# Patient Record
Sex: MALE | Race: WHITE | NOT HISPANIC OR LATINO
[De-identification: names, ages, dates, MRNs, and addresses within clinical notes are randomized per-mention and may not be internally consistent; named-entity substitution may affect disease eponyms.]

---

## 2017-06-22 ENCOUNTER — APPOINTMENT (OUTPATIENT)
Dept: RADIATION ONCOLOGY | Facility: CLINIC | Age: 72
End: 2017-06-22

## 2017-06-22 VITALS
WEIGHT: 162.81 LBS | HEART RATE: 78 BPM | RESPIRATION RATE: 15 BRPM | OXYGEN SATURATION: 98 % | SYSTOLIC BLOOD PRESSURE: 137 MMHG | BODY MASS INDEX: 25.88 KG/M2 | DIASTOLIC BLOOD PRESSURE: 81 MMHG

## 2017-06-26 LAB — PSA SERPL-MCNC: 0.11 NG/ML

## 2018-06-28 ENCOUNTER — APPOINTMENT (OUTPATIENT)
Dept: RADIATION ONCOLOGY | Facility: CLINIC | Age: 73
End: 2018-06-28
Payer: MEDICARE

## 2018-06-28 VITALS
HEART RATE: 87 BPM | DIASTOLIC BLOOD PRESSURE: 81 MMHG | WEIGHT: 161.6 LBS | BODY MASS INDEX: 25.66 KG/M2 | TEMPERATURE: 97.8 F | RESPIRATION RATE: 15 BRPM | HEIGHT: 66.5 IN | OXYGEN SATURATION: 97 % | SYSTOLIC BLOOD PRESSURE: 131 MMHG

## 2018-06-28 PROCEDURE — 99213 OFFICE O/P EST LOW 20 MIN: CPT

## 2018-07-03 LAB — PSA SERPL-MCNC: 0.09 NG/ML

## 2018-07-30 ENCOUNTER — TRANSCRIPTION ENCOUNTER (OUTPATIENT)
Age: 73
End: 2018-07-30

## 2018-07-30 RX ADMIN — Medication 1000 MILLIGRAM(S): at 22:30

## 2018-07-31 ENCOUNTER — INPATIENT (INPATIENT)
Facility: HOSPITAL | Age: 73
LOS: 1 days | Discharge: HOME | DRG: 864 | End: 2018-08-02
Attending: INTERNAL MEDICINE | Admitting: INTERNAL MEDICINE
Payer: MEDICARE

## 2018-07-31 VITALS
DIASTOLIC BLOOD PRESSURE: 90 MMHG | HEART RATE: 86 BPM | TEMPERATURE: 99 F | OXYGEN SATURATION: 96 % | RESPIRATION RATE: 20 BRPM | SYSTOLIC BLOOD PRESSURE: 151 MMHG

## 2018-07-31 DIAGNOSIS — M54.41 LUMBAGO WITH SCIATICA, RIGHT SIDE: ICD-10-CM

## 2018-07-31 DIAGNOSIS — I51.9 HEART DISEASE, UNSPECIFIED: ICD-10-CM

## 2018-07-31 DIAGNOSIS — E78.5 HYPERLIPIDEMIA, UNSPECIFIED: ICD-10-CM

## 2018-07-31 DIAGNOSIS — F10.10 ALCOHOL ABUSE, UNCOMPLICATED: ICD-10-CM

## 2018-07-31 DIAGNOSIS — R50.9 FEVER, UNSPECIFIED: ICD-10-CM

## 2018-07-31 DIAGNOSIS — C61 MALIGNANT NEOPLASM OF PROSTATE: ICD-10-CM

## 2018-07-31 DIAGNOSIS — I10 ESSENTIAL (PRIMARY) HYPERTENSION: ICD-10-CM

## 2018-07-31 LAB
ALBUMIN SERPL ELPH-MCNC: 4.2 G/DL — SIGNIFICANT CHANGE UP (ref 3.3–5)
ALP SERPL-CCNC: 102 U/L — SIGNIFICANT CHANGE UP (ref 40–120)
ALT FLD-CCNC: 42 U/L — SIGNIFICANT CHANGE UP (ref 10–45)
ANION GAP SERPL CALC-SCNC: 16 MMOL/L — SIGNIFICANT CHANGE UP (ref 5–17)
APTT BLD: 31.9 SEC — SIGNIFICANT CHANGE UP (ref 27.5–37.4)
AST SERPL-CCNC: 35 U/L — SIGNIFICANT CHANGE UP (ref 10–40)
BASE EXCESS BLDV CALC-SCNC: 0.6 MMOL/L — SIGNIFICANT CHANGE UP (ref -2–2)
BASOPHILS # BLD AUTO: 0 K/UL — SIGNIFICANT CHANGE UP (ref 0–0.2)
BASOPHILS NFR BLD AUTO: 0.2 % — SIGNIFICANT CHANGE UP (ref 0–2)
BILIRUB SERPL-MCNC: 0.4 MG/DL — SIGNIFICANT CHANGE UP (ref 0.2–1.2)
BUN SERPL-MCNC: 18 MG/DL — SIGNIFICANT CHANGE UP (ref 7–23)
CA-I SERPL-SCNC: 1.18 MMOL/L — SIGNIFICANT CHANGE UP (ref 1.12–1.3)
CALCIUM SERPL-MCNC: 9.9 MG/DL — SIGNIFICANT CHANGE UP (ref 8.4–10.5)
CHLORIDE BLDV-SCNC: 99 MMOL/L — SIGNIFICANT CHANGE UP (ref 96–108)
CHLORIDE SERPL-SCNC: 95 MMOL/L — LOW (ref 96–108)
CO2 BLDV-SCNC: 26 MMOL/L — SIGNIFICANT CHANGE UP (ref 22–30)
CO2 SERPL-SCNC: 24 MMOL/L — SIGNIFICANT CHANGE UP (ref 22–31)
CREAT SERPL-MCNC: 0.96 MG/DL — SIGNIFICANT CHANGE UP (ref 0.5–1.3)
EOSINOPHIL # BLD AUTO: 0.2 K/UL — SIGNIFICANT CHANGE UP (ref 0–0.5)
EOSINOPHIL NFR BLD AUTO: 3.5 % — SIGNIFICANT CHANGE UP (ref 0–6)
GAS PNL BLDV: 133 MMOL/L — LOW (ref 136–145)
GAS PNL BLDV: SIGNIFICANT CHANGE UP
GAS PNL BLDV: SIGNIFICANT CHANGE UP
GLUCOSE BLDV-MCNC: 106 MG/DL — HIGH (ref 70–99)
GLUCOSE SERPL-MCNC: 110 MG/DL — HIGH (ref 70–99)
HCO3 BLDV-SCNC: 25 MMOL/L — SIGNIFICANT CHANGE UP (ref 21–29)
HCT VFR BLD CALC: 38.6 % — LOW (ref 39–50)
HCT VFR BLDA CALC: 42 % — SIGNIFICANT CHANGE UP (ref 39–50)
HGB BLD CALC-MCNC: 13.5 G/DL — SIGNIFICANT CHANGE UP (ref 13–17)
HGB BLD-MCNC: 12.9 G/DL — LOW (ref 13–17)
HOROWITZ INDEX BLDV+IHG-RTO: SIGNIFICANT CHANGE UP
INR BLD: 0.95 RATIO — SIGNIFICANT CHANGE UP (ref 0.88–1.16)
LACTATE BLDV-MCNC: 1.9 MMOL/L — SIGNIFICANT CHANGE UP (ref 0.7–2)
LIDOCAIN IGE QN: 55 U/L — SIGNIFICANT CHANGE UP (ref 7–60)
LYMPHOCYTES # BLD AUTO: 0.4 K/UL — LOW (ref 1–3.3)
LYMPHOCYTES # BLD AUTO: 6.9 % — LOW (ref 13–44)
MAGNESIUM SERPL-MCNC: 1.9 MG/DL — SIGNIFICANT CHANGE UP (ref 1.6–2.6)
MCHC RBC-ENTMCNC: 30.4 PG — SIGNIFICANT CHANGE UP (ref 27–34)
MCHC RBC-ENTMCNC: 33.4 GM/DL — SIGNIFICANT CHANGE UP (ref 32–36)
MCV RBC AUTO: 91.1 FL — SIGNIFICANT CHANGE UP (ref 80–100)
MONOCYTES # BLD AUTO: 0.4 K/UL — SIGNIFICANT CHANGE UP (ref 0–0.9)
MONOCYTES NFR BLD AUTO: 6.5 % — SIGNIFICANT CHANGE UP (ref 2–14)
NEUTROPHILS # BLD AUTO: 4.8 K/UL — SIGNIFICANT CHANGE UP (ref 1.8–7.4)
NEUTROPHILS NFR BLD AUTO: 82.8 % — HIGH (ref 43–77)
PCO2 BLDV: 42 MMHG — SIGNIFICANT CHANGE UP (ref 35–50)
PH BLDV: 7.39 — SIGNIFICANT CHANGE UP (ref 7.35–7.45)
PLATELET # BLD AUTO: 172 K/UL — SIGNIFICANT CHANGE UP (ref 150–400)
PO2 BLDV: 34 MMHG — SIGNIFICANT CHANGE UP (ref 25–45)
POTASSIUM BLDV-SCNC: 3.3 MMOL/L — LOW (ref 3.5–5.3)
POTASSIUM SERPL-MCNC: 3.9 MMOL/L — SIGNIFICANT CHANGE UP (ref 3.5–5.3)
POTASSIUM SERPL-SCNC: 3.9 MMOL/L — SIGNIFICANT CHANGE UP (ref 3.5–5.3)
PROT SERPL-MCNC: 7.4 G/DL — SIGNIFICANT CHANGE UP (ref 6–8.3)
PROTHROM AB SERPL-ACNC: 10.3 SEC — SIGNIFICANT CHANGE UP (ref 9.8–12.7)
RBC # BLD: 4.23 M/UL — SIGNIFICANT CHANGE UP (ref 4.2–5.8)
RBC # FLD: 11.2 % — SIGNIFICANT CHANGE UP (ref 10.3–14.5)
SAO2 % BLDV: 57 % — LOW (ref 67–88)
SODIUM SERPL-SCNC: 135 MMOL/L — SIGNIFICANT CHANGE UP (ref 135–145)
WBC # BLD: 5.8 K/UL — SIGNIFICANT CHANGE UP (ref 3.8–10.5)
WBC # FLD AUTO: 5.8 K/UL — SIGNIFICANT CHANGE UP (ref 3.8–10.5)

## 2018-07-31 PROCEDURE — 99285 EMERGENCY DEPT VISIT HI MDM: CPT | Mod: 25

## 2018-07-31 PROCEDURE — 71045 X-RAY EXAM CHEST 1 VIEW: CPT | Mod: 26

## 2018-07-31 PROCEDURE — 93010 ELECTROCARDIOGRAM REPORT: CPT

## 2018-07-31 RX ORDER — TIZANIDINE 4 MG/1
14 TABLET ORAL AT BEDTIME
Qty: 0 | Refills: 0 | Status: COMPLETED | OUTPATIENT
Start: 2018-07-31 | End: 2018-07-31

## 2018-07-31 RX ORDER — SODIUM CHLORIDE 9 MG/ML
2000 INJECTION, SOLUTION INTRAVENOUS ONCE
Qty: 0 | Refills: 0 | Status: COMPLETED | OUTPATIENT
Start: 2018-07-31 | End: 2018-07-31

## 2018-07-31 RX ORDER — CEFEPIME 1 G/1
1000 INJECTION, POWDER, FOR SOLUTION INTRAMUSCULAR; INTRAVENOUS ONCE
Qty: 0 | Refills: 0 | Status: COMPLETED | OUTPATIENT
Start: 2018-07-31 | End: 2018-07-31

## 2018-07-31 RX ORDER — VANCOMYCIN HCL 1 G
1000 VIAL (EA) INTRAVENOUS ONCE
Qty: 0 | Refills: 0 | Status: DISCONTINUED | OUTPATIENT
Start: 2018-07-31 | End: 2018-07-31

## 2018-07-31 RX ORDER — MORPHINE SULFATE 50 MG/1
2 CAPSULE, EXTENDED RELEASE ORAL EVERY 4 HOURS
Qty: 0 | Refills: 0 | Status: DISCONTINUED | OUTPATIENT
Start: 2018-07-31 | End: 2018-08-02

## 2018-07-31 RX ORDER — PANTOPRAZOLE SODIUM 20 MG/1
40 TABLET, DELAYED RELEASE ORAL
Qty: 0 | Refills: 0 | Status: DISCONTINUED | OUTPATIENT
Start: 2018-07-31 | End: 2018-08-02

## 2018-07-31 RX ORDER — ASPIRIN/CALCIUM CARB/MAGNESIUM 324 MG
81 TABLET ORAL DAILY
Qty: 0 | Refills: 0 | Status: DISCONTINUED | OUTPATIENT
Start: 2018-07-31 | End: 2018-08-02

## 2018-07-31 RX ORDER — ACETAMINOPHEN 500 MG
1000 TABLET ORAL ONCE
Qty: 0 | Refills: 0 | Status: COMPLETED | OUTPATIENT
Start: 2018-07-31 | End: 2018-07-31

## 2018-07-31 RX ORDER — ENOXAPARIN SODIUM 100 MG/ML
40 INJECTION SUBCUTANEOUS DAILY
Qty: 0 | Refills: 0 | Status: DISCONTINUED | OUTPATIENT
Start: 2018-07-31 | End: 2018-08-02

## 2018-07-31 RX ORDER — SODIUM CHLORIDE 9 MG/ML
1000 INJECTION, SOLUTION INTRAVENOUS
Qty: 0 | Refills: 0 | Status: DISCONTINUED | OUTPATIENT
Start: 2018-07-31 | End: 2018-08-02

## 2018-07-31 RX ORDER — CELECOXIB 200 MG/1
200 CAPSULE ORAL DAILY
Qty: 0 | Refills: 0 | Status: DISCONTINUED | OUTPATIENT
Start: 2018-07-31 | End: 2018-08-02

## 2018-07-31 RX ORDER — ESOMEPRAZOLE MAGNESIUM 40 MG/1
1 CAPSULE, DELAYED RELEASE ORAL
Qty: 0 | Refills: 0 | COMMUNITY

## 2018-07-31 RX ORDER — VANCOMYCIN HCL 1 G
1000 VIAL (EA) INTRAVENOUS ONCE
Qty: 0 | Refills: 0 | Status: COMPLETED | OUTPATIENT
Start: 2018-07-31 | End: 2018-07-31

## 2018-07-31 RX ORDER — LOSARTAN POTASSIUM 100 MG/1
25 TABLET, FILM COATED ORAL DAILY
Qty: 0 | Refills: 0 | Status: DISCONTINUED | OUTPATIENT
Start: 2018-07-31 | End: 2018-08-02

## 2018-07-31 RX ORDER — ATORVASTATIN CALCIUM 80 MG/1
20 TABLET, FILM COATED ORAL AT BEDTIME
Qty: 0 | Refills: 0 | Status: DISCONTINUED | OUTPATIENT
Start: 2018-07-31 | End: 2018-08-02

## 2018-07-31 RX ORDER — PIPERACILLIN AND TAZOBACTAM 4; .5 G/20ML; G/20ML
3.38 INJECTION, POWDER, LYOPHILIZED, FOR SOLUTION INTRAVENOUS ONCE
Qty: 0 | Refills: 0 | Status: DISCONTINUED | OUTPATIENT
Start: 2018-07-31 | End: 2018-07-31

## 2018-07-31 RX ADMIN — SODIUM CHLORIDE 2000 MILLILITER(S): 9 INJECTION, SOLUTION INTRAVENOUS at 16:40

## 2018-07-31 RX ADMIN — SODIUM CHLORIDE 125 MILLILITER(S): 9 INJECTION, SOLUTION INTRAVENOUS at 19:45

## 2018-07-31 RX ADMIN — TIZANIDINE 14 MILLIGRAM(S): 4 TABLET ORAL at 22:35

## 2018-07-31 RX ADMIN — Medication 250 MILLIGRAM(S): at 19:42

## 2018-07-31 RX ADMIN — SODIUM CHLORIDE 2000 MILLILITER(S): 9 INJECTION, SOLUTION INTRAVENOUS at 16:37

## 2018-07-31 RX ADMIN — CEFEPIME 1000 MILLIGRAM(S): 1 INJECTION, POWDER, FOR SOLUTION INTRAMUSCULAR; INTRAVENOUS at 18:55

## 2018-07-31 RX ADMIN — Medication 400 MILLIGRAM(S): at 22:35

## 2018-07-31 RX ADMIN — CEFEPIME 100 MILLIGRAM(S): 1 INJECTION, POWDER, FOR SOLUTION INTRAMUSCULAR; INTRAVENOUS at 18:51

## 2018-07-31 NOTE — H&P ADULT - ASSESSMENT
72M with R lower back pain with radiating pain down R leg with known L3 herniated disc s/p facet joint steroid injection about 2 weeks ago presenting with fever for 5 days along with pain.  No incontinence, sensory loss, LE weakness, headache, respiratory, GI symptoms.  Sent by his ortho-spine doctor, Dr. Rosa, who is requesting admission for IV antibiotics and ortho-spine consult.  Pt had MRI spine done yesterday at [facility] that is reported as negative.

## 2018-07-31 NOTE — H&P ADULT - NEGATIVE GASTROINTESTINAL SYMPTOMS
no diarrhea/no vomiting/no constipation/no nausea/no abdominal pain/no melena/no hematochezia/no change in bowel habits/no flatulence

## 2018-07-31 NOTE — H&P ADULT - RS GEN PE MLT RESP DETAILS PC
respirations non-labored/clear to auscultation bilaterally/airway patent/normal/good air movement/no chest wall tenderness/breath sounds equal

## 2018-07-31 NOTE — H&P ADULT - HISTORY OF PRESENT ILLNESS
: 72M with R lower back pain with radiating pain down R leg with known L3 herniated disc s/p facet joint steroid injection about 2 weeks ago presenting with fever for 5 days along with pain.  No incontinence, sensory loss, LE weakness, headache, respiratory, GI symptoms.  Sent by his ortho-spine doctor, Dr. Rosa, who is requesting admission for IV antibiotics and ortho-spine consult.  Pt had MRI spine done yesterday at [facility] that is reported as negative.    	Gonzalez: 72M w/ R lower back pain rads to R leg and fever at home for 5 days. States he feels weaker. No urinary or bowel incontence, Left lower extremity weakness, chest pain. Dr Rosa is his pain mgmt doctor, requests admission. States he does not yet need pain medication 72M with R lower back pain with radiating pain down R leg with known L3 herniated disc s/p facet joint steroid injection about 2 weeks ago presenting with fever for 5 days along with pain.  No incontinence, sensory loss, LE weakness, headache, respiratory, GI symptoms.  Sent by his ortho-spine doctor, Dr. Rosa, who is requesting admission for IV antibiotics and ortho-spine consult.  Pt had MRI spine done yesterday at [facility] that is reported as negative.

## 2018-07-31 NOTE — ED PROVIDER NOTE - OBJECTIVE STATEMENT
Dr. Leon Note: 72M with R lower back pain with radiating pain down R leg with known L3 herniated disc s/p facet joint steroid injection about 2 weeks ago presenting with fever for 5 days along with pain.  No incontinence, sensory loss, LE weakness, headache, respiratory, GI symptoms.  Sent by his ortho-spine doctor, Dr. Rosa, who is requesting admission for IV antibiotics and ortho-spine consult.  Pt had MRI spine done yesterday at [facility] that is reported as negative. Dr. Leon Note: 72M with R lower back pain with radiating pain down R leg with known L3 herniated disc s/p facet joint steroid injection about 2 weeks ago presenting with fever for 5 days along with pain.  No incontinence, sensory loss, LE weakness, headache, respiratory, GI symptoms.  Sent by his ortho-spine doctor, Dr. Rosa, who is requesting admission for IV antibiotics and ortho-spine consult.  Pt had MRI spine done yesterday at [facility] that is reported as negative.    Gonzalez: 72M w/ R lower back pain rads to R leg and fever at home for 5 days. States he feels weaker. No urinary or bowel incontence, Left lower extremity weakness, chest pain. Dr Rosa is his pain mgmt doctor, requests admission. States he does not yet need pain medication

## 2018-07-31 NOTE — ED PROVIDER NOTE - NS ED ROS FT
General: + fever, chills  HENT: denies nasal congestion, sore throat, rhinorrhea  CV: denies chest pain, palpitations  Resp: denies difficulty breathing, cough  Abdominal: denies nausea, vomiting, diarrhea, abdominal pain  Neuro: + RLE weakness, denies headaches, numbness, tingling, dizziness, lightheadedness.

## 2018-07-31 NOTE — ED PROVIDER NOTE - CARE PLAN
Principal Discharge DX:	FUO (fever of unknown origin)  Secondary Diagnosis:	Low back pain Principal Discharge DX:	Fever of unknown origin  Secondary Diagnosis:	Low back pain

## 2018-07-31 NOTE — ED PROVIDER NOTE - PMH
Acid Reflux    Alcohol Abuse  "admits to daily drinking"  Cardiomegaly  "athlete's heart"  Chronic Lower Back Pain    Diverticulosis    History of Rotator Cuff Tear  bilateral  HTN - Hypertension    Hyperlipidemia    Left Ventricular Diastolic Dysfunction  as per Echo report  Mitral Valve Prolapse    Old Right ACL Tear    Osteoarthrosis    Prostate Cancer  " Trev 6"  Right Thyroid Nodule

## 2018-07-31 NOTE — H&P ADULT - NEGATIVE CARDIOVASCULAR SYMPTOMS
no chest pain/no orthopnea/no palpitations/no dyspnea on exertion/no claudication/no peripheral edema/no paroxysmal nocturnal dyspnea

## 2018-07-31 NOTE — ED PROVIDER NOTE - PHYSICAL EXAMINATION
No midline spine td, 5/5 motor UE and LE, no rashes, No perianal anesthesia. No midline spine td, 5/5 motor UE and LE, no rashes, No perianal anesthesia.    Gonzalez:  CONSTITUTIONAL: elderly male in painful distress  HEAD: Normocephalic; atraumatic  EYES: PERRLA, EOMI  ENMT: External appears normal; normal oropharynx  CARD: Normal Sl, S2; no audible murmurs,rubs, or gallops  RESP: Breathing comfortably on RA, normal wob, lungs ctab/l, no audible wheezes/rales/rhonchi  ABD: Soft, non-distended; non-tender; no rebound or guarding  Back: no erythema, no midline tenderness  EXT: No cyanosis/clubbing/edema, normal ROM in all four extremities; non-tender to palpation; distal pulses intact  SKIN: Warm, dry, no rashes  NEURO: aaox3, strength grossly intact

## 2018-07-31 NOTE — ED PROVIDER NOTE - PROGRESS NOTE DETAILS
Ortho spine states they have low suspicion for epidural abscess. MRI done yesterday as outpatient does not indicate any abscess. CXR and UA negative. Will admit for fever of unknown origin associated with ESR elevation

## 2018-07-31 NOTE — ED ADULT NURSE REASSESSMENT NOTE - NS ED NURSE REASSESS COMMENT FT1
Report received from ROMA Houser. Pt resting, c/o pain to R thigh but denying pain meds. Awaiting bed assignment. VSS. Safety maintained at all times, bed in lowest position, call bell in reach. Will continue to monitor closely.

## 2018-07-31 NOTE — ED ADULT NURSE NOTE - NSIMPLEMENTINTERV_GEN_ALL_ED
Implemented All Universal Safety Interventions:  [place] to call system. Call bell, personal items and telephone within reach. Instruct patient to call for assistance. Room bathroom lighting operational. Non-slip footwear when patient is off stretcher. Physically safe environment: no spills, clutter or unnecessary equipment. Stretcher in lowest position, wheels locked, appropriate side rails in place.

## 2018-07-31 NOTE — ED PROVIDER NOTE - PSH
History of Arthroscopy of bilateral shoulders  with rotator cuff repair 5/2006  History of Arthroscopy of Knee (multiple)  bilateral, last 2006  History of Left  Total Hip Replacement  3/2008  History of Tonsillectomy and Adenoidectomy    History of Total Hip Replacement  Right 10/27/01

## 2018-07-31 NOTE — ED PROVIDER NOTE - MEDICAL DECISION MAKING DETAILS
Dr. Leon Note: concern for infection after facet joint steroid injection despite negative MRI lumbar, screening labs, spine consult, and consider admission.

## 2018-08-01 LAB
ALBUMIN SERPL ELPH-MCNC: 3.5 G/DL — SIGNIFICANT CHANGE UP (ref 3.3–5)
ALP SERPL-CCNC: 78 U/L — SIGNIFICANT CHANGE UP (ref 40–120)
ALT FLD-CCNC: 30 U/L — SIGNIFICANT CHANGE UP (ref 10–45)
ANION GAP SERPL CALC-SCNC: 12 MMOL/L — SIGNIFICANT CHANGE UP (ref 5–17)
APPEARANCE UR: CLEAR — SIGNIFICANT CHANGE UP
AST SERPL-CCNC: 24 U/L — SIGNIFICANT CHANGE UP (ref 10–40)
BILIRUB SERPL-MCNC: 0.3 MG/DL — SIGNIFICANT CHANGE UP (ref 0.2–1.2)
BILIRUB UR-MCNC: NEGATIVE — SIGNIFICANT CHANGE UP
BUN SERPL-MCNC: 13 MG/DL — SIGNIFICANT CHANGE UP (ref 7–23)
CALCIUM SERPL-MCNC: 9 MG/DL — SIGNIFICANT CHANGE UP (ref 8.4–10.5)
CHLORIDE SERPL-SCNC: 99 MMOL/L — SIGNIFICANT CHANGE UP (ref 96–108)
CO2 SERPL-SCNC: 23 MMOL/L — SIGNIFICANT CHANGE UP (ref 22–31)
COLOR SPEC: YELLOW — SIGNIFICANT CHANGE UP
CREAT SERPL-MCNC: 0.83 MG/DL — SIGNIFICANT CHANGE UP (ref 0.5–1.3)
DIFF PNL FLD: NEGATIVE — SIGNIFICANT CHANGE UP
GLUCOSE SERPL-MCNC: 101 MG/DL — HIGH (ref 70–99)
GLUCOSE UR QL: NEGATIVE — SIGNIFICANT CHANGE UP
HCT VFR BLD CALC: 33.6 % — LOW (ref 39–50)
HGB BLD-MCNC: 11.4 G/DL — LOW (ref 13–17)
KETONES UR-MCNC: ABNORMAL
LEUKOCYTE ESTERASE UR-ACNC: NEGATIVE — SIGNIFICANT CHANGE UP
MCHC RBC-ENTMCNC: 30.6 PG — SIGNIFICANT CHANGE UP (ref 27–34)
MCHC RBC-ENTMCNC: 33.9 GM/DL — SIGNIFICANT CHANGE UP (ref 32–36)
MCV RBC AUTO: 90.1 FL — SIGNIFICANT CHANGE UP (ref 80–100)
NITRITE UR-MCNC: NEGATIVE — SIGNIFICANT CHANGE UP
PH UR: 6.5 — SIGNIFICANT CHANGE UP (ref 5–8)
PLATELET # BLD AUTO: 165 K/UL — SIGNIFICANT CHANGE UP (ref 150–400)
POTASSIUM SERPL-MCNC: 4.1 MMOL/L — SIGNIFICANT CHANGE UP (ref 3.5–5.3)
POTASSIUM SERPL-SCNC: 4.1 MMOL/L — SIGNIFICANT CHANGE UP (ref 3.5–5.3)
PROT SERPL-MCNC: 6.2 G/DL — SIGNIFICANT CHANGE UP (ref 6–8.3)
PROT UR-MCNC: SIGNIFICANT CHANGE UP
RBC # BLD: 3.73 M/UL — LOW (ref 4.2–5.8)
RBC # FLD: 12.4 % — SIGNIFICANT CHANGE UP (ref 10.3–14.5)
SODIUM SERPL-SCNC: 134 MMOL/L — LOW (ref 135–145)
SP GR SPEC: 1.02 — SIGNIFICANT CHANGE UP (ref 1.01–1.02)
UROBILINOGEN FLD QL: NEGATIVE — SIGNIFICANT CHANGE UP
WBC # BLD: 6.56 K/UL — SIGNIFICANT CHANGE UP (ref 3.8–10.5)
WBC # FLD AUTO: 6.56 K/UL — SIGNIFICANT CHANGE UP (ref 3.8–10.5)

## 2018-08-01 PROCEDURE — 99222 1ST HOSP IP/OBS MODERATE 55: CPT | Mod: GC

## 2018-08-01 RX ORDER — ACETAMINOPHEN 500 MG
1000 TABLET ORAL ONCE
Qty: 0 | Refills: 0 | Status: COMPLETED | OUTPATIENT
Start: 2018-08-01 | End: 2018-08-02

## 2018-08-01 RX ORDER — TIZANIDINE 4 MG/1
22 TABLET ORAL ONCE
Qty: 0 | Refills: 0 | Status: COMPLETED | OUTPATIENT
Start: 2018-08-01 | End: 2018-08-01

## 2018-08-01 RX ADMIN — CELECOXIB 200 MILLIGRAM(S): 200 CAPSULE ORAL at 12:46

## 2018-08-01 RX ADMIN — ENOXAPARIN SODIUM 40 MILLIGRAM(S): 100 INJECTION SUBCUTANEOUS at 11:53

## 2018-08-01 RX ADMIN — LOSARTAN POTASSIUM 25 MILLIGRAM(S): 100 TABLET, FILM COATED ORAL at 06:09

## 2018-08-01 RX ADMIN — PANTOPRAZOLE SODIUM 40 MILLIGRAM(S): 20 TABLET, DELAYED RELEASE ORAL at 06:09

## 2018-08-01 RX ADMIN — TIZANIDINE 22 MILLIGRAM(S): 4 TABLET ORAL at 21:49

## 2018-08-01 RX ADMIN — ATORVASTATIN CALCIUM 20 MILLIGRAM(S): 80 TABLET, FILM COATED ORAL at 21:48

## 2018-08-01 RX ADMIN — SODIUM CHLORIDE 125 MILLILITER(S): 9 INJECTION, SOLUTION INTRAVENOUS at 06:10

## 2018-08-01 RX ADMIN — CELECOXIB 200 MILLIGRAM(S): 200 CAPSULE ORAL at 11:52

## 2018-08-01 RX ADMIN — Medication 81 MILLIGRAM(S): at 11:52

## 2018-08-01 NOTE — CONSULT NOTE ADULT - ASSESSMENT
72M chronic low back pain, HTN, HLD, GERD, OA, prostate ca in remission x 10 years p/w fevers.     FULL NOTE TO FOLLOW 72M chronic low back pain, HTN, HLD, GERD, OA, prostate ca in remission x 10 years p/w fevers. No clear source of infection. MRI Spine reviewed w radiology attending and shows no signs of abscess or discitis that would explain fevers. It is possible that the Tizanidine which was started two weeks ago could be causing the fevers.     - monitor off abx for now  - follow up BCx- if BCx are negative this may be due to Tizanidine; at that point could consider switching to another muscle relaxant.   - if pt spikes a fever, would re-culture and obtain ct c/a/p w contrast if able to eval for occult infection     Will follow. D/w primary team. 72M chronic low back pain, HTN, HLD, GERD, OA, prostate ca in remission x 10 years p/w fevers. No clear source of infection. MRI Spine reviewed w radiology attending and shows no signs of abscess or discitis that would explain fevers. It is possible that the Tizanidine which was started two weeks ago could be causing the fevers.   Overall ? drug fever vs other infectious etiology     Plan:   - monitor off abx for now  - follow up BCx- if BCx are negative this may be due to Tizanidine; at that point could consider switching to another muscle relaxant.   - if pt spikes a fever, would re-culture and obtain ct c/a/p w contrast to eval for occult infection   - ESR/CRP in am     Will follow. D/w primary team. 72M chronic low back pain, HTN, HLD, GERD, OA, prostate ca in remission x 10 years p/w fevers. No clear source of infection. MRI Spine reviewed w radiology attending and shows no signs of abscess or discitis that would explain fevers. It is possible that the Tizanidine which was started two weeks ago could be causing the fevers.   No fever or leucocytosis in hospital.   Overall ? drug fever vs other infectious etiology     Plan:   - monitor off abx for now  - follow up BCx- if BCx are negative this may be due to Tizanidine; at that point could consider switching to another muscle relaxant.   - if pt spikes a fever, would re-culture and obtain ct c/a/p w contrast to eval for occult infection   - ESR/CRP somewhat elevated.   - If spikes overnight after repeating blood can start Vanco 1 gm iv q12h with Cefepime 2 gm iv q12h     Will follow. D/w primary team.

## 2018-08-01 NOTE — PROGRESS NOTE ADULT - ASSESSMENT
72M with R lower back pain with radiating pain down R leg with known L3 herniated disc s/p facet joint steroid injection about 2 weeks ago presenting with fever for 5 days along with pain.  No incontinence, sensory loss, LE weakness, headache, respiratory, GI symptoms.  Sent by his ortho-spine doctor, Dr. Rosa, who is requesting admission for IV antibiotics and ortho-spine consult.  Pt had MRI spine done yesterday at [facility] that is reported as negative.      Problem/Plan - 1:  ·  Problem: Fever of unknown origin.  Plan: S/P Blood cultures . MRI LS Spine done yesterday is negative .Ortho and ID helping. Will rpt ESR and CRP in AM.      Problem/Plan - 2:  ·  Problem: Chronic bilateral low back pain with right-sided sciatica.  Plan: S/P MRI and supposed to get Steroid shot outpt. Continue home meds. Neurology consult pending      Problem/Plan - 3:  ·  Problem: Hyperlipidemia.  Plan: Statin.      Problem/Plan - 4:  ·  Problem: HTN (hypertension).  Plan: BP meds with parameters.      Problem/Plan - 5:  ·  Problem: Left Ventricular Diastolic Dysfunction.  Plan: Not in Acute CHF .      Problem/Plan - 6:  Problem: Alcohol Abuse. Plan: Denies any h/O of Alcohol withdrawal     Problem/Plan - 7:  ·  Problem: Prostate Cancer.  Plan: Outpt follow up.

## 2018-08-01 NOTE — CONSULT NOTE ADULT - SUBJECTIVE AND OBJECTIVE BOX
72yMale who is followed by Dr. Michel for pain management and was recently given an epidural injected 1-2 weeks ago(pt unsure exact day) presents with high grade fever starting 7/28, with temperatures elevated persistently to 101s-102s. He took aspirin and ice for the fevers. Denies worsening in back pain or radicular symptoms (has right thigh radiculopathy when he ambulates) since he started having fevers. Denies bowel or bladder incontinence.     Denies recent travel. Denies recent weight loss or gain. Denies recent illness.     The patient states that he has no medical history beside multiple orthopaedic surgeries and back pain.     PMH:  Old Right ACL Tear  Alcohol Abuse  Left Ventricular Diastolic Dysfunction  Mitral Valve Prolapse  Acid Reflux  History of Rotator Cuff Tear  Osteoarthrosis  Right Thyroid Nodule  Prostate Cancer  Diverticulosis  Chronic Lower Back Pain  Hyperlipidemia  Cardiomegaly  HTN - Hypertension    PSH:  History of Total Hip Replacement  History of Arthroscopy of Knee (multiple)  History of Left  Total Hip Replacement  History of Arthroscopy of bilateral shoulders  History of Tonsillectomy and Adenoidectomy    AH:    Meds: See med rec    T(C): 36.4 (08-01-18 @ 01:34)  HR: 66 (08-01-18 @ 01:34)  BP: 134/86 (08-01-18 @ 01:34)  RR: 18 (08-01-18 @ 01:34)  SpO2: 97% (08-01-18 @ 01:34)  Wt(kg): --                        12.9   5.8   )-----------( 172      ( 31 Jul 2018 16:28 )             38.6     07-31    135  |  95<L>  |  18  ----------------------------<  110<H>  3.9   |  24  |  0.96    Ca    9.9      31 Jul 2018 16:28  Mg     1.9     07-31    TPro  7.4  /  Alb  4.2  /  TBili  0.4  /  DBili  x   /  AST  35  /  ALT  42  /  AlkPhos  102  07-31    PT/INR - ( 31 Jul 2018 16:28 )   PT: 10.3 sec;   INR: 0.95 ratio         PTT - ( 31 Jul 2018 16:28 )  PTT:31.9 sec      Spine PE:  Skin intact  No gross deformity  No midline TTP C/T/L/S spine  No bony step offs  No paraspinal muscle ttp/hypertonicity   Negative Straight leg raise  Negative clonus/Babinski/moraes  No saddle anesthesia    Motor:                   C5                C6              C7               C8           T1   R            5/5                5/5            5/5             5/5          5/5  L             5/5               5/5             5/5             5/5          5/5                L2             L3             L4               L5            S1  R         5/5           5/5          5/5             5/5           5/5  L          5/5          5/5           5/5             5/5           5/5    Sensory:            C5         C6         C7      C8       T1        (0=absent, 1=impaired, 2=normal, NT=not testable)  R         2            2           2        2         2  L          2            2           2        2         2               L2          L3         L4      L5       S1         (0=absent, 1=impaired, 2=normal, NT=not testable)  R         2            2            2        2        2  L          2            2           2        2         2    [facility]  No signs of OM or epidural abscess  chronic lumbar scoliosis and spondylithesis

## 2018-08-01 NOTE — CONSULT NOTE ADULT - ATTENDING COMMENTS
Question regarding zanaflex as source of fever.  I think this is generally unlikely.  It is listed as a 1-2% side effect on labeling, but I doubt this as a cause of his fever on therapeutic dosing.  Would consider other infectious, neoplastic, inflammatory etiologies. Ok by me to reduce the dose of tizanidine slightly as tolerated for comfort, either way.  discussed with patient who feels well today, no further fever.   He is already followed closely by spine for his lumbar stenosis, getting intermittent spinal injections.
Jose Richards  Pager: [phone]. If no response or past 5 pm call [phone].

## 2018-08-01 NOTE — CONSULT NOTE ADULT - ASSESSMENT
73YO M w/ multiple knee replacements and B/L total hip replacement, chronic R lower back pain, known L3 herniated disc w/ radiating pain down R leg admitted for fevers/rigors/chills at home. Admitted for infectious workup for source, for which thus far w/u has been negative. Takes up to 24mg Zanaflex daily x2 weeks for RLE. Has remained afebrile since admission, concern for possible medication induced fever.   May be 2/2 zanaflex overuse as this can be seen as a side effect. Would caution withdrawing too rapidly as there are adverse effects with this too ie rebound hypertension, tachycardia, hypertonia especially in elderly patients on high doses. Would continue monitoring for fever and consider slow taper to a lower dose daily or perhaps switching to another muscle relaxant. 73YO M w/ multiple knee replacements and B/L total hip replacement, chronic R lower back pain, known L3 herniated disc w/ radiating pain down R leg admitted for fevers/rigors/chills at home. Admitted for infectious workup for source, for which thus far w/u has been negative. Takes up to 24mg Zanaflex daily x2 weeks for RLE. Has remained afebrile since admission, concern per medicine team for possible medication induced fever.     May be 2/2 zanaflex overuse as this can be seen as a side effect. Would caution withdrawing too rapidly as there are adverse effects with this too ie rebound hypertension, tachycardia, hypertonia especially in elderly patients on high doses. Would continue monitoring for fever and consider slow taper to a lower dose daily or perhaps switching to another muscle relaxant.

## 2018-08-01 NOTE — CONSULT NOTE ADULT - ASSESSMENT
72M with fevers of unknown origin, no signs of spinal source    ·	multimodal pain control  ·	WBAT, OOB, PT  ·	No acute ortho intervention  ·	Please page back as needed if you have questions    Ortho [phone]

## 2018-08-01 NOTE — PROGRESS NOTE ADULT - SUBJECTIVE AND OBJECTIVE BOX
INTERVAL HPI/OVERNIGHT EVENTS: I feel better and want to go home tomorrow.  Vital Signs Last 24 Hrs  T(C): 36.8 (01 Aug 2018 14:24), Max: 37.3 (2018 20:59)  T(F): 98.3 (01 Aug 2018 14:24), Max: 99.1 (2018 20:59)  HR: 71 (01 Aug 2018 14:24) (66 - 78)  BP: 126/68 (01 Aug 2018 14:24) (126/68 - 157/83)  BP(mean): --  RR: 18 (01 Aug 2018 14:24) (18 - 18)  SpO2: 96% (01 Aug 2018 14:24) (96% - 99%)  I&O's Summary    2018 07:  -  01 Aug 2018 07:00  --------------------------------------------------------  IN: 1575 mL / OUT: 700 mL / NET: 875 mL    01 Aug 2018 07:01  -  01 Aug 2018 15:47  --------------------------------------------------------  IN: 480 mL / OUT: 420 mL / NET: 60 mL      MEDICATIONS  (STANDING):  aspirin enteric coated 81 milliGRAM(s) Oral daily  atorvastatin 20 milliGRAM(s) Oral at bedtime  celecoxib 200 milliGRAM(s) Oral daily  enoxaparin Injectable 40 milliGRAM(s) SubCutaneous daily  lactated ringers. 1000 milliLiter(s) (125 mL/Hr) IV Continuous <Continuous>  losartan 25 milliGRAM(s) Oral daily  pantoprazole    Tablet 40 milliGRAM(s) Oral before breakfast  tiZANidine 22 milliGRAM(s) Oral once    MEDICATIONS  (PRN):  morphine  - Injectable 2 milliGRAM(s) IV Push every 4 hours PRN Moderate Pain (4 - 6)    LABS:                        11.4   6.56  )-----------( 165      ( 01 Aug 2018 08:35 )             33.6         134<L>  |  99  |  13  ----------------------------<  101<H>  4.1   |  23  |  0.83    Ca    9.0      01 Aug 2018 06:07  Mg     1.9         TPro  6.2  /  Alb  3.5  /  TBili  0.3  /  DBili  x   /  AST  24  /  ALT  30  /  AlkPhos  78      PT/INR - ( 2018 16:28 )   PT: 10.3 sec;   INR: 0.95 ratio         PTT - ( 2018 16:28 )  PTT:31.9 sec  Urinalysis Basic - ( 01 Aug 2018 02:30 )    Color: Yellow / Appearance: Clear / S.016 / pH: x  Gluc: x / Ketone: Trace  / Bili: Negative / Urobili: Negative   Blood: x / Protein: Trace / Nitrite: Negative   Leuk Esterase: Negative / RBC: x / WBC x   Sq Epi: x / Non Sq Epi: x / Bacteria: x      CAPILLARY BLOOD GLUCOSE            Urinalysis Basic - ( 01 Aug 2018 02:30 )    Color: Yellow / Appearance: Clear / S.016 / pH: x  Gluc: x / Ketone: Trace  / Bili: Negative / Urobili: Negative   Blood: x / Protein: Trace / Nitrite: Negative   Leuk Esterase: Negative / RBC: x / WBC x   Sq Epi: x / Non Sq Epi: x / Bacteria: x      REVIEW OF SYSTEMS:  CONSTITUTIONAL: No fever, weight loss, or fatigue  EYES: No eye pain, visual disturbances, or discharge  ENMT:  No difficulty hearing, tinnitus, vertigo; No sinus or throat pain  NECK: No pain or stiffness  BREASTS: No pain, masses, or nipple discharge  RESPIRATORY: No cough, wheezing, chills or hemoptysis; No shortness of breath  CARDIOVASCULAR: No chest pain, palpitations, dizziness, or leg swelling  GASTROINTESTINAL: No abdominal or epigastric pain. No nausea, vomiting, or hematemesis; No diarrhea or constipation. No melena or hematochezia.  GENITOURINARY: No dysuria, frequency, hematuria, or incontinence  NEUROLOGICAL: No headaches, memory loss, loss of strength, numbness, or tremors  SKIN: No itching, burning, rashes, or lesions   LYMPH NODES: No enlarged glands  ENDOCRINE: No heat back, and rt  extremity pain    Consultant(s) Notes Reviewed:  [x ] YES  [ ] NO    PHYSICAL EXAM:  GENERAL: NAD, well-groomed, well-developed,not in any distress ,  HEAD:  Atraumatic, Normocephalic  EYES: EOMI, PERRLA, conjunctiva and sclera clear  ENMT: No tonsillar erythema, exudates, or enlargement; Moist mucous membranes, Good dentition, No lesions  NECK: Supple, No JVD, Normal thyroid  NERVOUS SYSTEM:  Alert & Oriented X3, No focal deficit , Power G5 with  no sensory loss   CHEST/LUNG: Good air entry bilateral with no  rales, rhonchi, wheezing, or rubs  HEART: Regular rate and rhythm; No murmurs, rubs, or gallops  ABDOMEN: Soft, Nontender, Nondistended; Bowel sounds present  EXTREMITIES:  2+ Peripheral Pulses, No clubbing, cyanosis, or edema  SKIN: No rashes or lesions    Care Discussed with Consultants/Other Providers [ x] YES  [ ] NO

## 2018-08-01 NOTE — CONSULT NOTE ADULT - SUBJECTIVE AND OBJECTIVE BOX
HPI:  72M chronic low back pain, HTN, HLD, GERD, OA, prostate ca in remission x 10 years p/w fevers. Pt states he was sent in by ortho for fevers 102-104F. Pt states he started having fevers on Saturday. Pt was sent by his ortho-spine doctor, Dr. Rosa, who is requesting admission for IV antibiotics and ortho-spine consult.  He had recent R facet joint steroid injection two weeks ago. No worsening hip or back pain. Had MRI spine done on Monday, results sent down to our radiology department to be uploaded. Has b/l prosthetic knees, no worsening knee pain or erythema. Denies cough, sore throat, rhinorrhea, cp, abd pain, n/v, diarrhea. Denies change in mental status (family at bedside confirms). Denies recent travel or animal/bug/tick bites. No rash. no pets. [place] in [place]. Recently started on tizanidine for leg muscle spasm; isn't on anti-psychotics.     Pt has not had any fevers in the hospital.     PAST MEDICAL & SURGICAL HISTORY:  Old Right ACL Tear  Alcohol Abuse: &quot;admits to daily drinking&quot;  Left Ventricular Diastolic Dysfunction: as per Echo report  Mitral Valve Prolapse  Acid Reflux  History of Rotator Cuff Tear: bilateral  Osteoarthrosis  Right Thyroid Nodule  Prostate Cancer: &quot; [place] 6&quot;  Diverticulosis  Chronic Lower Back Pain  Hyperlipidemia  Cardiomegaly: &quot;athlete&#x27;s heart&quot;  HTN - Hypertension  History of Total Hip Replacement: Right 10/27/01  History of Arthroscopy of Knee (multiple): bilateral, last   History of Left  Total Hip Replacement: 3/2008  History of Arthroscopy of bilateral shoulders: with rotator cuff repair 2006  History of Tonsillectomy and Adenoidectomy      Allergies    No Known Allergies    Intolerances        ANTIMICROBIALS:      OTHER MEDS:  aspirin enteric coated 81 milliGRAM(s) Oral daily  atorvastatin 20 milliGRAM(s) Oral at bedtime  celecoxib 200 milliGRAM(s) Oral daily  enoxaparin Injectable 40 milliGRAM(s) SubCutaneous daily  lactated ringers. 1000 milliLiter(s) IV Continuous <Continuous>  losartan 25 milliGRAM(s) Oral daily  morphine  - Injectable 2 milliGRAM(s) IV Push every 4 hours PRN  pantoprazole    Tablet 40 milliGRAM(s) Oral before breakfast  tiZANidine 22 milliGRAM(s) Oral once      SOCIAL HISTORY:    Substance Use (street drugs): denies  Tobacco Usage:  denies  Alcohol Usage: drinks etoh    FAMILY HISTORY:  No pertinent family history in first degree relatives      ROS:   All other systems negative     Constitutional: + fever, no chills, no weight loss, no night sweats  Eye: no eye pain, no redness, no vision changes  ENT:  no sore throat, no rhinorrhea  Cardiovascular:  no chest pain, no palpitation  Respiratory:  no SOB, no cough  GI:  no abd pain, no vomiting, no diarrhea  urinary: no dysuria, no hematuria, no flank pain  : no  discharge or bleeding  musculoskeletal:  no joint pain, no joint swelling  skin:  no rash  neurology:  no headache, no seizure, no change in mental status  psych: no anxiety, no depression     Physical Exam:    General:    NAD, non toxic  Head: atraumatic, normocephalic  Eyes: normal sclera and conjunctiva  ENT:   no oropharyngeal lesions, no LAD, neck supple  Cardio:    regular S1,S2, no murmur  Respiratory:   clear b/l, no wheezing  abd:   soft, BS +, not tender, no hepatosplenomegaly  :     no CVAT, no suprapubic tenderness, no jernigan  Musculoskeletal : no joint swelling, no edema  Skin:    no rash  vascular: no lines, normal pulses  Neurologic:     no focal deficits  psych: normal affect, no suicidal ideation      Drug Dosing Weight    Weight (kg): 71.8 (2018 20:59)    Vital Signs Last 24 Hrs  T(F): 98.1 (18 @ 05:48), Max: 99.1 (18 @ 20:59)    Vital Signs Last 24 Hrs  HR: 74 (18 @ 05:48) (66 - 88)  BP: 146/85 (18 @ 05:48) (134/86 - 157/83)  RR: 18 (18 @ 05:48)  SpO2: 99% (18 @ 05:48) (96% - 100%)  Wt(kg): --                          11.4   6.56  )-----------( 165      ( 01 Aug 2018 08:35 )             33.6           134<L>  |  99  |  13  ----------------------------<  101<H>  4.1   |  23  |  0.83    Ca    9.0      01 Aug 2018 06:07  Mg     1.9     07-31    TPro  6.2  /  Alb  3.5  /  TBili  0.3  /  DBili  x   /  AST  24  /  ALT  30  /  AlkPhos  78  08-      Urinalysis Basic - ( 01 Aug 2018 02:30 )    Color: Yellow / Appearance: Clear / S.016 / pH: x  Gluc: x / Ketone: Trace  / Bili: Negative / Urobili: Negative   Blood: x / Protein: Trace / Nitrite: Negative   Leuk Esterase: Negative / RBC: x / WBC x   Sq Epi: x / Non Sq Epi: x / Bacteria: x        MICROBIOLOGY:  BCx x 2 sent and pending          RADIOLOGY:    CXR- no focal consolidation  - MRI spine- outside images being uploaded by radiology HPI:  72M chronic low back pain, HTN, HLD, GERD, OA, prostate ca in remission x 10 years p/w fevers. Pt states he was sent in by ortho for fevers 102-104F. Pt states he started having fevers on Saturday. Pt was sent by his ortho-spine doctor, Dr. Rosa, who is requesting admission for IV antibiotics and ortho-spine consult.  He had recent R facet joint steroid injection two weeks ago. No worsening hip or back pain. Had MRI spine done on Monday, results sent down to our radiology department to be uploaded. Has b/l prosthetic knees, no worsening knee pain or erythema. Denies cough, sore throat, rhinorrhea, cp, abd pain, n/v, diarrhea. Denies change in mental status (family at bedside confirms). Denies recent travel or animal/bug/tick bites. No rash. no pets. [place] the water in [place]. Recently started on tizanidine for leg muscle spasm; isn't on anti-psychotics.     Pt has not had any fevers in the hospital. Currently feels well.       PAST MEDICAL & SURGICAL HISTORY:  Old Right ACL Tear  Alcohol Abuse: &quot;admits to daily drinking&quot;  Left Ventricular Diastolic Dysfunction: as per Echo report  Mitral Valve Prolapse  Acid Reflux  History of Rotator Cuff Tear: bilateral  Osteoarthrosis  Right Thyroid Nodule  Prostate Cancer: &quot; [place] 6&quot;  Diverticulosis  Chronic Lower Back Pain  Hyperlipidemia  Cardiomegaly: &quot;athlete&#x27;s heart&quot;  HTN - Hypertension  History of Total Hip Replacement: Right 10/27/01  History of Arthroscopy of Knee (multiple): bilateral, last   History of Left  Total Hip Replacement: 3/2008  History of Arthroscopy of bilateral shoulders: with rotator cuff repair 2006  History of Tonsillectomy and Adenoidectomy      Allergies  No Known Allergies        ANTIMICROBIALS:      OTHER MEDS:  aspirin enteric coated 81 milliGRAM(s) Oral daily  atorvastatin 20 milliGRAM(s) Oral at bedtime  celecoxib 200 milliGRAM(s) Oral daily  enoxaparin Injectable 40 milliGRAM(s) SubCutaneous daily  lactated ringers. 1000 milliLiter(s) IV Continuous <Continuous>  losartan 25 milliGRAM(s) Oral daily  morphine  - Injectable 2 milliGRAM(s) IV Push every 4 hours PRN  pantoprazole    Tablet 40 milliGRAM(s) Oral before breakfast  tiZANidine 22 milliGRAM(s) Oral once      SOCIAL HISTORY:  Lives with partner,   Substance Use (street drugs): denies  Tobacco Usage:  denies  Alcohol Usage: drinks etoh    FAMILY HISTORY:  No pertinent family history in first degree relatives      ROS:   All other systems negative     Constitutional: + fever, no chills, no weight loss, no night sweats  Eye: no eye pain, no redness, no vision changes  ENT:  no sore throat, no rhinorrhea  Cardiovascular:  no chest pain, no palpitation  Respiratory:  no SOB, no cough  GI:  no abd pain, no vomiting, no diarrhea  urinary: no dysuria, no hematuria, no flank pain  : no  discharge or bleeding  musculoskeletal:  chronic b/l knee joint pain,   skin:  no rash  neurology:  no headache, no change in mental status  psych: no anxiety, no depression       Physical Exam:    General:    NAD, non toxic  Head: atraumatic, normocephalic  Eyes: normal sclera and conjunctiva  ENT:   no oropharyngeal lesions, no LAD, neck supple  Cardio:    regular S1,S2, no murmur  Respiratory:   clear b/l, no wheezing  abd:   soft, BS +, not tender,   :     no CVAT, no suprapubic tenderness, no jernigan  Musculoskeletal : +effusion b/l knee joints with well healed scars.   Skin:    no rash  vascular: no lines, normal pulses  Neurologic: No point tenderness in back, able to move B/l lower extremities.    psych: normal affect, no suicidal ideation      Vital Signs Last 24 Hrs  T(F): 98.1 (18 @ 05:48), Max: 99.1 (18 @ 20:59)    Vital Signs Last 24 Hrs  HR: 74 (18 @ 05:48) (66 - 88)  BP: 146/85 (18 @ 05:48) (134/86 - 157/83)  RR: 18 (18 @ 05:48)  SpO2: 99% (18 @ 05:48) (96% - 100%)                            11.4   6.56  )-----------( 165      ( 01 Aug 2018 08:35 )             33.6           134<L>  |  99  |  13  ----------------------------<  101<H>  4.1   |  23  |  0.83    Ca    9.0      01 Aug 2018 06:07  Mg     1.9     07-    TPro  6.2  /  Alb  3.5  /  TBili  0.3  /  DBili  x   /  AST  24  /  ALT  30  /  AlkPhos  78  08-      Urinalysis Basic - ( 01 Aug 2018 02:30 )    Color: Yellow / Appearance: Clear / S.016 / pH: x  Gluc: x / Ketone: Trace  / Bili: Negative / Urobili: Negative   Blood: x / Protein: Trace / Nitrite: Negative   Leuk Esterase: Negative / RBC: x / WBC x   Sq Epi: x / Non Sq Epi: x / Bacteria: x      MICROBIOLOGY:  BCx x 2 sent and pending      RADIOLOGY: Imaging reviewed personally.   CXR- no focal consolidation  - MRI spine- outside imaging reviewed with Neuroradiology.

## 2018-08-01 NOTE — CONSULT NOTE ADULT - SUBJECTIVE AND OBJECTIVE BOX
NEUROLOGY CONSULT     71YO M with multiple knee replacements and B/L total hip replacement, chronic R lower back pain with radiating pain down R leg with known L3 herniated disc admitted with fever+sweatiness for 2 days at home. Pt states that he noticed he had rigors and chills on Sat, and temp at home was 104 and did not respond to antipyretics. He decided to come into ER on Thursday for this. He has not had T spikes since being in hospital. He attributes this to AC in hospital of which he does not have at home.   He has herniated disc at L3 and had been getting steroid facet joint injections since about Feb by Ortho Spine.   He has been in a lot of discomfort in R leg that is chronic but worsened. He did not want to take pain meds, so ortho spine gave him Zanaflex 2mg to take 2-3 at night and in the morning. He notes that he takes about 6-7 tabs twice a day for a total of 22-24mg a day.   Sent by his ortho-spine doctor, Dr. Rosa, who is requesting admission for IV antibiotics and ortho-spine consult.    Pt had MRI spine done yesterday at [facility] that is reportedly negative.    No incontinence, sensory loss, headache, respiratory, GI symptoms. No recent viral illness, no body aches. Has chronic joint pain otherwise. Denies N/V/D. No HAs.     PAST MEDICAL & SURGICAL HISTORY:  Old Right ACL Tear  Alcohol Abuse: &quot;admits to daily drinking&quot;  Left Ventricular Diastolic Dysfunction: as per Echo report  Mitral Valve Prolapse  Acid Reflux  History of Rotator Cuff Tear: bilateral  Osteoarthrosis  Right Thyroid Nodule  Prostate Cancer: &quot; [place] 6&quot;  Diverticulosis  Chronic Lower Back Pain  Hyperlipidemia  Cardiomegaly: &quot;athlete&#x27;s heart&quot;  HTN - Hypertension  History of Total Hip Replacement: Right 10/27/01  History of Arthroscopy of Knee (multiple): bilateral, last 2006  History of Left  Total Hip Replacement: 3/2008  History of Arthroscopy of bilateral shoulders: with rotator cuff repair 5/2006  History of Tonsillectomy and Adenoidectomy      Allergies    No Known Allergies    Intolerances        MEDICATIONS  (STANDING):  acetaminophen  IVPB. 1000 milliGRAM(s) IV Intermittent once  aspirin enteric coated 81 milliGRAM(s) Oral daily  atorvastatin 20 milliGRAM(s) Oral at bedtime  celecoxib 200 milliGRAM(s) Oral daily  enoxaparin Injectable 40 milliGRAM(s) SubCutaneous daily  lactated ringers. 1000 milliLiter(s) (125 mL/Hr) IV Continuous <Continuous>  losartan 25 milliGRAM(s) Oral daily  pantoprazole    Tablet 40 milliGRAM(s) Oral before breakfast  tiZANidine 22 milliGRAM(s) Oral once    MEDICATIONS  (PRN):  morphine  - Injectable 2 milliGRAM(s) IV Push every 4 hours PRN Moderate Pain (4 - 6)      SOCIAL HISTORY: Denies tobacco/EtOH/drug use     FAMILY HISTORY:  No pertinent family history in first degree relatives      REVIEW OF SYSTEMS:  CONSTITUTIONAL:  No weight loss, fever, chills, weakness or fatigue.  HEENT:  Eyes:  No visual loss, blurred vision, double vision or yellow sclerae. Ears, Nose, Throat:  No hearing loss, sneezing, congestion, runny nose or sore throat.  SKIN:  No rash or itching.  CARDIOVASCULAR:  No chest pain, chest pressure or chest discomfort. No palpitations or edema.  RESPIRATORY:  No shortness of breath, cough or sputum.  GASTROINTESTINAL:  No anorexia, nausea, vomiting or diarrhea. No abdominal pain or blood.  GENITOURINARY:  denies incontinence/retention   NEUROLOGICAL:  see hpi  MUSCULOSKELETAL:  No muscle, back pain, joint pain or stiffness.  HEMATOLOGIC:  No anemia, bleeding or bruising.  LYMPHATICS:  No enlarged nodes. No history of splenectomy.  PSYCHIATRIC:  No history of depression or anxiety.  ENDOCRINOLOGIC:  No reports of sweating, cold or heat intolerance. No polyuria or polydipsia.      Vital Signs Last 24 Hrs  T(C): 36.8 (01 Aug 2018 14:24), Max: 37.3 (31 Jul 2018 20:59)  T(F): 98.3 (01 Aug 2018 14:24), Max: 99.1 (31 Jul 2018 20:59)  HR: 71 (01 Aug 2018 14:24) (66 - 78)  BP: 126/68 (01 Aug 2018 14:24) (126/68 - 157/80)  BP(mean): --  RR: 18 (01 Aug 2018 14:24) (18 - 18)  SpO2: 96% (01 Aug 2018 14:24) (96% - 99%)    PHYSICAL EXAM:   General appearance: No acute distress                 Mental Status: AAOx3, fluent speech, follows simple commands, able to name  Cranial Nerves: EOMI, PERRL, VFF, V1-V3 intact, face symmetric,  tongue midline  Motor: strength 5/5 throughout. No drift x4 - did not allow manual strength testing against resistance in R LE due to pain but no drift   Sensation: grossly intact to LT/PP throughout extremities.   Coordination: FTN intact b/l  Reflexes: 0 bilateral biceps, brachioradialis, patellar and ankle  Gait: deferred     LABS:                          11.4   6.56  )-----------( 165      ( 01 Aug 2018 08:35 )             33.6     08-01    134<L>  |  99  |  13  ----------------------------<  101<H>  4.1   |  23  |  0.83    Ca    9.0      01 Aug 2018 06:07  Mg     1.9     07-31    TPro  6.2  /  Alb  3.5  /  TBili  0.3  /  DBili  x   /  AST  24  /  ALT  30  /  AlkPhos  78  08-01      IMAGING: NEUROLOGY CONSULT     71YO M with multiple knee replacements and B/L total hip replacement, chronic R lower back pain with radiating pain down R leg with known L3 herniated disc admitted with fever+sweatiness for 2 days at home. Pt states that he noticed he had rigors and chills on Sat, and temp at home was 104 and did not respond to antipyretics. He decided to come into ER on Thursday for this. He has not had T spikes since being in hospital. He attributes this to AC in hospital of which he does not have at home.   He has herniated disc at L3 and had been getting steroid facet joint injections since about Feb by Ortho Spine. limited mobility, exercise intolerance  He has been in a lot of discomfort in R leg that is chronic but worsened. He did not want to take pain meds, so ortho spine gave him Zanaflex 2mg to take 2-3 at night and in the morning. He notes that he takes about 6-7 tabs twice a day for a total of 22-24mg a day.   Sent by his ortho-spine doctor, Dr. Rosa, who is requesting admission for IV antibiotics and ortho-spine consult.    Pt had MRI spine done yesterday at [facility] that is reportedly negative for compression.    No incontinence, sensory loss, headache, respiratory, GI symptoms. No recent viral illness, no body aches. Has chronic joint pain otherwise. Denies N/V/D. No HAs.     PAST MEDICAL & SURGICAL HISTORY:  Old Right ACL Tear  Alcohol Abuse: &quot;admits to daily drinking&quot;  Left Ventricular Diastolic Dysfunction: as per Echo report  Mitral Valve Prolapse  Acid Reflux  History of Rotator Cuff Tear: bilateral  Osteoarthrosis  Right Thyroid Nodule  Prostate Cancer: &quot; [place] 6&quot;  Diverticulosis  Chronic Lower Back Pain  Hyperlipidemia  Cardiomegaly: &quot;athlete&#x27;s heart&quot;  HTN - Hypertension  History of Total Hip Replacement: Right 10/27/01  History of Arthroscopy of Knee (multiple): bilateral, last 2006  History of Left  Total Hip Replacement: 3/2008  History of Arthroscopy of bilateral shoulders: with rotator cuff repair 5/2006  History of Tonsillectomy and Adenoidectomy      Allergies    No Known Allergies    Intolerances        MEDICATIONS  (STANDING):  acetaminophen  IVPB. 1000 milliGRAM(s) IV Intermittent once  aspirin enteric coated 81 milliGRAM(s) Oral daily  atorvastatin 20 milliGRAM(s) Oral at bedtime  celecoxib 200 milliGRAM(s) Oral daily  enoxaparin Injectable 40 milliGRAM(s) SubCutaneous daily  lactated ringers. 1000 milliLiter(s) (125 mL/Hr) IV Continuous <Continuous>  losartan 25 milliGRAM(s) Oral daily  pantoprazole    Tablet 40 milliGRAM(s) Oral before breakfast  tiZANidine 22 milliGRAM(s) Oral once    MEDICATIONS  (PRN):  morphine  - Injectable 2 milliGRAM(s) IV Push every 4 hours PRN Moderate Pain (4 - 6)      SOCIAL HISTORY: Denies tobacco/EtOH/drug use     FAMILY HISTORY:  No pertinent family history in first degree relatives      REVIEW OF SYSTEMS:  CONSTITUTIONAL:  No weight loss, fever, chills, weakness or fatigue.  HEENT:  Eyes:  No visual loss, blurred vision, double vision or yellow sclerae. Ears, Nose, Throat:  No hearing loss, sneezing, congestion, runny nose or sore throat.  SKIN:  No rash or itching.  CARDIOVASCULAR:  No chest pain, chest pressure or chest discomfort. No palpitations or edema.  RESPIRATORY:  No shortness of breath, cough or sputum.  GASTROINTESTINAL:  No anorexia, nausea, vomiting or diarrhea. No abdominal pain or blood.  GENITOURINARY:  denies incontinence/retention   NEUROLOGICAL:  see hpi  MUSCULOSKELETAL:  No muscle, back pain, joint pain or stiffness.  HEMATOLOGIC:  No anemia, bleeding or bruising.  LYMPHATICS:  No enlarged nodes. No history of splenectomy.  PSYCHIATRIC:  No history of depression or anxiety.  ENDOCRINOLOGIC:  No reports of sweating, cold or heat intolerance. No polyuria or polydipsia.      Vital Signs Last 24 Hrs  T(C): 36.8 (01 Aug 2018 14:24), Max: 37.3 (31 Jul 2018 20:59)  T(F): 98.3 (01 Aug 2018 14:24), Max: 99.1 (31 Jul 2018 20:59)  HR: 71 (01 Aug 2018 14:24) (66 - 78)  BP: 126/68 (01 Aug 2018 14:24) (126/68 - 157/80)  BP(mean): --  RR: 18 (01 Aug 2018 14:24) (18 - 18)  SpO2: 96% (01 Aug 2018 14:24) (96% - 99%)    PHYSICAL EXAM:   General appearance: No acute distress                 Mental Status: AAOx3, fluent speech, follows simple commands, able to name  Cranial Nerves: EOMI, PERRL, VFF, V1-V3 intact, face symmetric,  tongue midline  Motor: strength 5/5 throughout. No drift x4 - did not allow manual strength testing against resistance in R LE due to pain but no drift   Sensation: grossly intact to LT/PP throughout extremities.   Coordination: FTN intact b/l  Reflexes: 2+ bilateral biceps, 1+ brachioradialis, 2+patellar and 0 ankles  Gait: deferred . gets up walks with walker    LABS:                          11.4   6.56  )-----------( 165      ( 01 Aug 2018 08:35 )             33.6     08-01    134<L>  |  99  |  13  ----------------------------<  101<H>  4.1   |  23  |  0.83    Ca    9.0      01 Aug 2018 06:07  Mg     1.9     07-31    TPro  6.2  /  Alb  3.5  /  TBili  0.3  /  DBili  x   /  AST  24  /  ALT  30  /  AlkPhos  78  08-01

## 2018-08-02 ENCOUNTER — TRANSCRIPTION ENCOUNTER (OUTPATIENT)
Age: 73
End: 2018-08-02

## 2018-08-02 VITALS — WEIGHT: 162.7 LBS

## 2018-08-02 LAB
ANION GAP SERPL CALC-SCNC: 12 MMOL/L — SIGNIFICANT CHANGE UP (ref 5–17)
BUN SERPL-MCNC: 10 MG/DL — SIGNIFICANT CHANGE UP (ref 7–23)
CALCIUM SERPL-MCNC: 8.7 MG/DL — SIGNIFICANT CHANGE UP (ref 8.4–10.5)
CHLORIDE SERPL-SCNC: 101 MMOL/L — SIGNIFICANT CHANGE UP (ref 96–108)
CO2 SERPL-SCNC: 24 MMOL/L — SIGNIFICANT CHANGE UP (ref 22–31)
CREAT SERPL-MCNC: 0.74 MG/DL — SIGNIFICANT CHANGE UP (ref 0.5–1.3)
CRP SERPL-MCNC: 6.2 MG/DL — HIGH (ref 0–0.4)
ERYTHROCYTE [SEDIMENTATION RATE] IN BLOOD: 34 MM/HR — HIGH (ref 0–20)
GLUCOSE SERPL-MCNC: 111 MG/DL — HIGH (ref 70–99)
HCT VFR BLD CALC: 32 % — LOW (ref 39–50)
HGB BLD-MCNC: 10.6 G/DL — LOW (ref 13–17)
MCHC RBC-ENTMCNC: 29.8 PG — SIGNIFICANT CHANGE UP (ref 27–34)
MCHC RBC-ENTMCNC: 33.1 GM/DL — SIGNIFICANT CHANGE UP (ref 32–36)
MCV RBC AUTO: 89.9 FL — SIGNIFICANT CHANGE UP (ref 80–100)
PLATELET # BLD AUTO: 176 K/UL — SIGNIFICANT CHANGE UP (ref 150–400)
POTASSIUM SERPL-MCNC: 3.5 MMOL/L — SIGNIFICANT CHANGE UP (ref 3.5–5.3)
POTASSIUM SERPL-SCNC: 3.5 MMOL/L — SIGNIFICANT CHANGE UP (ref 3.5–5.3)
RAPID RVP RESULT: SIGNIFICANT CHANGE UP
RBC # BLD: 3.56 M/UL — LOW (ref 4.2–5.8)
RBC # FLD: 12.5 % — SIGNIFICANT CHANGE UP (ref 10.3–14.5)
SODIUM SERPL-SCNC: 137 MMOL/L — SIGNIFICANT CHANGE UP (ref 135–145)
WBC # BLD: 6.62 K/UL — SIGNIFICANT CHANGE UP (ref 3.8–10.5)
WBC # FLD AUTO: 6.62 K/UL — SIGNIFICANT CHANGE UP (ref 3.8–10.5)

## 2018-08-02 PROCEDURE — 87581 M.PNEUMON DNA AMP PROBE: CPT

## 2018-08-02 PROCEDURE — 85014 HEMATOCRIT: CPT

## 2018-08-02 PROCEDURE — 87040 BLOOD CULTURE FOR BACTERIA: CPT

## 2018-08-02 PROCEDURE — 85610 PROTHROMBIN TIME: CPT

## 2018-08-02 PROCEDURE — 82435 ASSAY OF BLOOD CHLORIDE: CPT

## 2018-08-02 PROCEDURE — 82803 BLOOD GASES ANY COMBINATION: CPT

## 2018-08-02 PROCEDURE — 84132 ASSAY OF SERUM POTASSIUM: CPT

## 2018-08-02 PROCEDURE — 93005 ELECTROCARDIOGRAM TRACING: CPT

## 2018-08-02 PROCEDURE — 82330 ASSAY OF CALCIUM: CPT

## 2018-08-02 PROCEDURE — 84145 PROCALCITONIN (PCT): CPT

## 2018-08-02 PROCEDURE — 80053 COMPREHEN METABOLIC PANEL: CPT

## 2018-08-02 PROCEDURE — 85652 RBC SED RATE AUTOMATED: CPT

## 2018-08-02 PROCEDURE — 87486 CHLMYD PNEUM DNA AMP PROBE: CPT

## 2018-08-02 PROCEDURE — 83605 ASSAY OF LACTIC ACID: CPT

## 2018-08-02 PROCEDURE — 80048 BASIC METABOLIC PNL TOTAL CA: CPT

## 2018-08-02 PROCEDURE — 85730 THROMBOPLASTIN TIME PARTIAL: CPT

## 2018-08-02 PROCEDURE — 82947 ASSAY GLUCOSE BLOOD QUANT: CPT

## 2018-08-02 PROCEDURE — 87798 DETECT AGENT NOS DNA AMP: CPT

## 2018-08-02 PROCEDURE — 99285 EMERGENCY DEPT VISIT HI MDM: CPT | Mod: 25

## 2018-08-02 PROCEDURE — 96374 THER/PROPH/DIAG INJ IV PUSH: CPT

## 2018-08-02 PROCEDURE — 87633 RESP VIRUS 12-25 TARGETS: CPT

## 2018-08-02 PROCEDURE — 86140 C-REACTIVE PROTEIN: CPT

## 2018-08-02 PROCEDURE — 99232 SBSQ HOSP IP/OBS MODERATE 35: CPT

## 2018-08-02 PROCEDURE — 85027 COMPLETE CBC AUTOMATED: CPT

## 2018-08-02 PROCEDURE — 83735 ASSAY OF MAGNESIUM: CPT

## 2018-08-02 PROCEDURE — 81003 URINALYSIS AUTO W/O SCOPE: CPT

## 2018-08-02 PROCEDURE — 83690 ASSAY OF LIPASE: CPT

## 2018-08-02 PROCEDURE — 71045 X-RAY EXAM CHEST 1 VIEW: CPT

## 2018-08-02 PROCEDURE — 99221 1ST HOSP IP/OBS SF/LOW 40: CPT

## 2018-08-02 PROCEDURE — 84295 ASSAY OF SERUM SODIUM: CPT

## 2018-08-02 RX ORDER — ASCORBIC ACID 60 MG
0 TABLET,CHEWABLE ORAL
Qty: 0 | Refills: 0 | COMMUNITY

## 2018-08-02 RX ORDER — TIZANIDINE 4 MG/1
20 TABLET ORAL AT BEDTIME
Qty: 0 | Refills: 0 | Status: DISCONTINUED | OUTPATIENT
Start: 2018-08-02 | End: 2018-08-02

## 2018-08-02 RX ORDER — LACTOBACILLUS ACIDOPHILUS 100MM CELL
0 CAPSULE ORAL
Qty: 0 | Refills: 0 | COMMUNITY

## 2018-08-02 RX ORDER — TIZANIDINE 4 MG/1
5 TABLET ORAL
Qty: 0 | Refills: 0 | DISCHARGE
Start: 2018-08-02

## 2018-08-02 RX ORDER — MELOXICAM 15 MG/1
1 TABLET ORAL
Qty: 0 | Refills: 0 | COMMUNITY

## 2018-08-02 RX ORDER — CHOLECALCIFEROL (VITAMIN D3) 125 MCG
0 CAPSULE ORAL
Qty: 0 | Refills: 0 | COMMUNITY

## 2018-08-02 RX ORDER — IRBESARTAN 75 MG/1
1 TABLET ORAL
Qty: 0 | Refills: 0 | COMMUNITY

## 2018-08-02 RX ORDER — CELECOXIB 200 MG/1
1 CAPSULE ORAL
Qty: 0 | Refills: 0 | DISCHARGE
Start: 2018-08-02

## 2018-08-02 RX ORDER — LOSARTAN POTASSIUM 100 MG/1
1 TABLET, FILM COATED ORAL
Qty: 0 | Refills: 0 | DISCHARGE
Start: 2018-08-02

## 2018-08-02 RX ORDER — CHOLECALCIFEROL (VITAMIN D3) 125 MCG
1 CAPSULE ORAL
Qty: 30 | Refills: 0
Start: 2018-08-02 | End: 2018-08-31

## 2018-08-02 RX ORDER — OMEGA-3 ACID ETHYL ESTERS 1 G
0 CAPSULE ORAL
Qty: 0 | Refills: 0 | COMMUNITY

## 2018-08-02 RX ORDER — UBIDECARENONE 100 MG
400 CAPSULE ORAL
Qty: 0 | Refills: 0 | COMMUNITY

## 2018-08-02 RX ORDER — CHOLECALCIFEROL (VITAMIN D3) 125 MCG
1 CAPSULE ORAL
Qty: 30 | Refills: 0 | OUTPATIENT
Start: 2018-08-02 | End: 2018-08-31

## 2018-08-02 RX ORDER — GLUCOSAMINE/MSM/CHONDROITIN A 750-375MG
0 TABLET ORAL
Qty: 0 | Refills: 0 | COMMUNITY

## 2018-08-02 RX ORDER — TIZANIDINE 4 MG/1
7 TABLET ORAL
Qty: 0 | Refills: 0 | COMMUNITY

## 2018-08-02 RX ADMIN — SODIUM CHLORIDE 125 MILLILITER(S): 9 INJECTION, SOLUTION INTRAVENOUS at 00:46

## 2018-08-02 RX ADMIN — CELECOXIB 200 MILLIGRAM(S): 200 CAPSULE ORAL at 11:08

## 2018-08-02 RX ADMIN — Medication 81 MILLIGRAM(S): at 11:08

## 2018-08-02 RX ADMIN — LOSARTAN POTASSIUM 25 MILLIGRAM(S): 100 TABLET, FILM COATED ORAL at 06:36

## 2018-08-02 RX ADMIN — Medication 1000 MILLIGRAM(S): at 01:40

## 2018-08-02 RX ADMIN — Medication 400 MILLIGRAM(S): at 00:45

## 2018-08-02 RX ADMIN — PANTOPRAZOLE SODIUM 40 MILLIGRAM(S): 20 TABLET, DELAYED RELEASE ORAL at 06:36

## 2018-08-02 NOTE — PROGRESS NOTE ADULT - SUBJECTIVE AND OBJECTIVE BOX
INTERVAL HPI/OVERNIGHT EVENTS: I feel much better . I want to go home.   Vital Signs Last 24 Hrs  T(C): 37 (02 Aug 2018 05:26), Max: 37 (02 Aug 2018 05:26)  T(F): 98.6 (02 Aug 2018 05:26), Max: 98.6 (02 Aug 2018 05:26)  HR: 72 (02 Aug 2018 05:26) (71 - 72)  BP: 147/82 (02 Aug 2018 05:26) (136/74 - 147/82)  BP(mean): --  RR: 18 (02 Aug 2018 05:26) (18 - 18)  SpO2: 98% (02 Aug 2018 05:26) (97% - 98%)  I&O's Summary    01 Aug 2018 07:01  -  02 Aug 2018 07:00  --------------------------------------------------------  IN: 2660 mL / OUT: 1535 mL / NET: 1125 mL    02 Aug 2018 07:01  -  02 Aug 2018 15:07  --------------------------------------------------------  IN: 240 mL / OUT: 250 mL / NET: -10 mL      MEDICATIONS  (STANDING):  aspirin enteric coated 81 milliGRAM(s) Oral daily  atorvastatin 20 milliGRAM(s) Oral at bedtime  celecoxib 200 milliGRAM(s) Oral daily  enoxaparin Injectable 40 milliGRAM(s) SubCutaneous daily  losartan 25 milliGRAM(s) Oral daily  pantoprazole    Tablet 40 milliGRAM(s) Oral before breakfast  tiZANidine 20 milliGRAM(s) Oral at bedtime    MEDICATIONS  (PRN):  morphine  - Injectable 2 milliGRAM(s) IV Push every 4 hours PRN Moderate Pain (4 - 6)    LABS:                        10.6   6.62  )-----------( 176      ( 02 Aug 2018 07:40 )             32.0     08-02    137  |  101  |  10  ----------------------------<  111<H>  3.5   |  24  |  0.74    Ca    8.7      02 Aug 2018 06:48  Mg     1.9     07    TPro  6.2  /  Alb  3.5  /  TBili  0.3  /  DBili  x   /  AST  24  /  ALT  30  /  AlkPhos  78  08-    PT/INR - ( 2018 16:28 )   PT: 10.3 sec;   INR: 0.95 ratio         PTT - ( 2018 16:28 )  PTT:31.9 sec  Urinalysis Basic - ( 01 Aug 2018 02:30 )    Color: Yellow / Appearance: Clear / S.016 / pH: x  Gluc: x / Ketone: Trace  / Bili: Negative / Urobili: Negative   Blood: x / Protein: Trace / Nitrite: Negative   Leuk Esterase: Negative / RBC: x / WBC x   Sq Epi: x / Non Sq Epi: x / Bacteria: x      CAPILLARY BLOOD GLUCOSE            Urinalysis Basic - ( 01 Aug 2018 02:30 )    Color: Yellow / Appearance: Clear / S.016 / pH: x  Gluc: x / Ketone: Trace  / Bili: Negative / Urobili: Negative   Blood: x / Protein: Trace / Nitrite: Negative   Leuk Esterase: Negative / RBC: x / WBC x   Sq Epi: x / Non Sq Epi: x / Bacteria: x      REVIEW OF SYSTEMS:  CONSTITUTIONAL: No fever, weight loss, or fatigue  EYES: No eye pain, visual disturbances, or discharge  ENMT:  No difficulty hearing, tinnitus, vertigo; No sinus or throat pain  NECK: No pain or stiffness  BREASTS: No pain, masses, or nipple discharge  RESPIRATORY: No cough, wheezing, chills or hemoptysis; No shortness of breath  CARDIOVASCULAR: No chest pain, palpitations, dizziness, or leg swelling  GASTROINTESTINAL: No abdominal or epigastric pain. No nausea, vomiting, or hematemesis; No diarrhea or constipation. No melena or hematochezia.  GENITOURINARY: No dysuria, frequency, hematuria, or incontinence  NEUROLOGICAL: No headaches, memory loss, loss of strength, numbness, or tremors  SKIN: No itching, burning, rashes, or lesions   LYMPH NODES: No enlarged glands  ENDOCRINE: No heat or cold intolerance; No hair loss  MUSCULOSKELETAL: No joint pain or swelling; No muscle, back, or extremity pain  Consultant(s) Notes Reviewed:  [x ] YES  [ ] NO    PHYSICAL EXAM:  GENERAL: NAD, well-groomed, well-developed, not in any distress ,  HEAD:  Atraumatic, Normocephalic  EYES: EOMI, PERRLA, conjunctiva and sclera clear  ENMT: No tonsillar erythema, exudates, or enlargement; Moist mucous membranes, Good dentition, No lesions  NECK: Supple, No JVD, Normal thyroid  NERVOUS SYSTEM:  Alert & Oriented X3, No focal deficit   CHEST/LUNG: Good air entry bilateral with no  rales, rhonchi, wheezing, or rubs  HEART: Regular rate and rhythm; No murmurs, rubs, or gallops  ABDOMEN: Soft, Nontender, Nondistended; Bowel sounds present  EXTREMITIES:  2+ Peripheral Pulses, No clubbing, cyanosis, or edema  SKIN: No rashes or lesions

## 2018-08-02 NOTE — DISCHARGE NOTE ADULT - PATIENT PORTAL LINK FT
You can access the [facility] Patient Portal, offered by [facility], by registering with the following website: [email or web address]

## 2018-08-02 NOTE — DISCHARGE NOTE ADULT - MEDICATION SUMMARY - MEDICATIONS TO STOP TAKING
I will STOP taking the medications listed below when I get home from the hospital:    meloxicam 15 mg oral tablet  -- 1 tab(s) by mouth once a day    irbesartan 300 mg oral tablet  -- 1 tab(s) by mouth once a day    CoQ10  -- 400 milligram(s) by mouth once a day    Fish Oil 1200 mg oral capsule    Red Yeast Rice 600 mg oral capsule    potassium citrate 99mg oral capsule    Acidophilus oral tablet    Glucosamine & Chondroitin with MSM oral tablet    Milk Thistle Extract 1000mg    biotin 1000 mcg oral tablet    Turmeric curcumin 1000mg    Green Tea 500mg    Vitamin C 1000 mg oral tablet    tiZANidine 2 mg oral tablet  -- 7 tab(s) by mouth once a day (at bedtime) and up to 7 tablets as needed    L-Arginine L-Ornithine L-Lysine Complex    Calcium & Magnesium 500mg    Bromelain 250mg

## 2018-08-02 NOTE — DISCHARGE NOTE ADULT - MEDICATION SUMMARY - MEDICATIONS TO TAKE
I will START or STAY ON the medications listed below when I get home from the hospital:    celecoxib 200 mg oral capsule  -- 1 cap(s) by mouth once a day  -- Indication: For Pain    Aspirin Enteric Coated 81 mg oral delayed release tablet  -- 1 tab(s) by mouth once a day  -- Indication: For Cardio protective    losartan 25 mg oral tablet  -- 1 tab(s) by mouth once a day  -- Indication: For HTN (hypertension)    atorvastatin 20 mg oral tablet  -- 1 tab(s) by mouth once a day  -- Indication: For Hyperlipidemia    tiZANidine 4 mg oral tablet  -- 5 tab(s) by mouth once a day (at bedtime)  -- Indication: For Pain    NexIUM 24HR 20 mg oral delayed release capsule  -- 1 cap(s) by mouth once a day  -- Indication: For dyspepsia    Vitamin D3 2000 intl units oral tablet  -- 1 tab(s) by mouth once a day   -- Indication: For supplement

## 2018-08-02 NOTE — PROGRESS NOTE ADULT - SUBJECTIVE AND OBJECTIVE BOX
72y old  Male who presents with a chief complaint of " I have a fever of 104 degree Fahrenheit " (02 Aug 2018 11:00)      Interval history:  Afebrile overnight, feels well.     No Known Allergies    Antimicrobials:      REVIEW OF SYSTEMS:  No chest pain  No cough, no SOB  No N/V/D, no abdominal pain  No dysuria or frequency  No rash.     Vital Signs Last 24 Hrs  T(C): 37 (08-02-18 @ 05:26), Max: 37 (08-02-18 @ 05:26)  T(F): 98.6 (08-02-18 @ 05:26), Max: 98.6 (08-02-18 @ 05:26)  HR: 72 (08-02-18 @ 05:26) (71 - 72)  BP: 147/82 (08-02-18 @ 05:26) (136/74 - 147/82)  RR: 18 (08-02-18 @ 05:26) (18 - 18)  SpO2: 98% (08-02-18 @ 05:26) (97% - 98%)    PHYSICAL EXAM:  Patient in no acute distress. AAOX3.  No icterus, no oral ulcers.  Cardiovascular: S1S2 normal.  Lungs: Good air entry B/L lung fields.  Gastrointestinal: soft, nontender, nondistended.  Extremities: no edema.  IV sites not inflamed.                           10.6   6.62  )-----------( 176      ( 02 Aug 2018 07:40 )             32.0   08-02    137  |  101  |  10  ----------------------------<  111<H>  3.5   |  24  |  0.74    Ca    8.7      02 Aug 2018 06:48    TPro  6.2  /  Alb  3.5  /  TBili  0.3  /  DBili  x   /  AST  24  /  ALT  30  /  AlkPhos  78  08-01      LIVER FUNCTIONS - ( 01 Aug 2018 06:07 )  Alb: 3.5 g/dL / Pro: 6.2 g/dL / ALK PHOS: 78 U/L / ALT: 30 U/L / AST: 24 U/L / GGT: x               Culture - Blood (collected 31 Jul 2018 18:39)  Source: .Blood Blood-Peripheral  Preliminary Report (01 Aug 2018 19:01):    No growth to date.    Culture - Blood (collected 31 Jul 2018 18:39)  Source: .Blood Blood-Peripheral  Preliminary Report (01 Aug 2018 19:01):    No growth to date.

## 2018-08-02 NOTE — PROGRESS NOTE ADULT - ASSESSMENT
72M with R lower back pain with radiating pain down R leg with known L3 herniated disc s/p facet joint steroid injection about 2 weeks ago presenting with fever for 5 days along with pain.  No incontinence, sensory loss, LE weakness, headache, respiratory, GI symptoms.  Sent by his ortho-spine doctor, Dr. Rosa, who is requesting admission for IV antibiotics and ortho-spine consult.  Pt had MRI spine done yesterday at [facility] that is reported as negative.      Problem/Plan - 1:  ·  Problem: Fever of unknown origin.  Plan: S/P Blood cultures . MRI LS Spine done is negative for OM or Epidural abscess  .Ortho and ID helping. Rpt ESR and CRP is down. NO Fever or Leucocytosis . Pt is off Abxs. D/W ID attending and will decide after seeing pt if he can go home today.      Problem/Plan - 2:  ·  Problem: Chronic bilateral low back pain with right-sided sciatica.  Plan: S/P MRI and supposed to get Steroid shot outpt. Continue home meds. Neurology consult noted. Will slowly taper down Tizanidine.      Problem/Plan - 3:  ·  Problem: Hyperlipidemia.  Plan: Statin.      Problem/Plan - 4:  ·  Problem: HTN (hypertension).  Plan: BP meds with parameters.      Problem/Plan - 5:  ·  Problem: Left Ventricular Diastolic Dysfunction.  Plan: Not in Acute CHF .      Problem/Plan - 6:  Problem: Alcohol Abuse. Plan: Denies any h/O of Alcohol withdrawal     Problem/Plan - 7:  ·  Problem: Prostate Cancer.  Plan: Outpt follow up.

## 2018-08-02 NOTE — DISCHARGE NOTE ADULT - PLAN OF CARE
no fever Take meds as prescribed and follow up with your physician and rehab physician   Return to emergency if worsens no pain NGA  Take meds as prescribed  Follow up with Dr Rosa after rehab

## 2018-08-02 NOTE — DISCHARGE NOTE ADULT - CARE PLAN
Principal Discharge DX:	Drug-induced fever  Goal:	no fever  Assessment and plan of treatment:	Take meds as prescribed and follow up with your physician and rehab physician   Return to emergency if worsens  Secondary Diagnosis:	Chronic bilateral low back pain with right-sided sciatica  Goal:	no pain  Assessment and plan of treatment:	NGA  Take meds as prescribed  Follow up with Dr Rosa after rehab

## 2018-08-02 NOTE — PROGRESS NOTE ADULT - ASSESSMENT
72M chronic low back pain, HTN, HLD, GERD, OA, prostate ca in remission x 10 years p/w fevers. No clear source of infection. MRI Spine reviewed w radiology attending and shows no signs of abscess or discitis that would explain fevers. It is possible that the Tizanidine which was started two weeks ago could be causing the fevers.   No fever or leucocytosis in hospital.   Overall ? drug fever vs other infectious etiology   No obvious source of infection at this time.  RVP negative, U/A with no pyuria, ESR/CRP trended down, Blood cx NTD.       Plan:   - monitor off abx   - follow up BCx- if BCx are negative this may be due to Tizanidine; could consider switching to another muscle relaxant.   - Pt to follow as outpt for repeat blood work. Breathing spontaneous and unlabored. Breath sounds clear and equal bilaterally with regular rhythm.

## 2018-08-02 NOTE — DISCHARGE NOTE ADULT - HOSPITAL COURSE
72M with R lower back pain with radiating pain down R leg with known L3 herniated disc s/p facet joint steroid injection about 2 weeks ago presenting with fever for 5 days along with pain.  No incontinence, sensory loss, LE weakness, headache, respiratory, GI symptoms.  Sent by his ortho-spine doctor, Dr. Rosa, who is requesting admission for IV antibiotics and ortho-spine consult.  Pt had MRI spine at [facility] that is reported as negative. Patient was admitted for:     {[id number]} Problem/Plan - 1:  ·  Problem: Fever of unknown origin.  Vancomycin IV x 1 dose given. IVF's started. S/P Blood cultures . MRI LS Spine negative .ID consulted. Monitor off antibiotics. No further fever. Repeat ESR and CRP decreased. Blood cultures were negative to date    {[id number]} Problem/Plan - 2:  ·  Problem: Chronic bilateral low back pain with right-sided sciatica.  Plan: S/P MRI and Steroid injections outpatient. WBAT, OOB, PT  No acute ortho intervention. Neurology consulted. infectious workup for source, for which thus far w/u has been negative. Takes up to 24mg Zanaflex daily x2 weeks for RLE. Has remained afebrile since admission, concern for possible medication induced fever.   May be 2/2 Zanaflex overuse as this can be seen as a side effect. Would caution withdrawing too rapidly as there are adverse effects with this too ie rebound hypertension, tachycardia, hypertonia especially in elderly patients on high doses. Would continue monitoring for fever and consider slow taper to a lower dose daily or perhaps switching to another muscle relaxant.   Discharge to [facility] then after to follow up with Dr Rosa.

## 2018-08-05 LAB
CULTURE RESULTS: SIGNIFICANT CHANGE UP
CULTURE RESULTS: SIGNIFICANT CHANGE UP
SPECIMEN SOURCE: SIGNIFICANT CHANGE UP
SPECIMEN SOURCE: SIGNIFICANT CHANGE UP

## 2018-08-08 ENCOUNTER — APPOINTMENT (OUTPATIENT)
Dept: INFECTIOUS DISEASE | Facility: CLINIC | Age: 73
End: 2018-08-08
Payer: MEDICARE

## 2018-08-08 VITALS
SYSTOLIC BLOOD PRESSURE: 161 MMHG | TEMPERATURE: 99.1 F | OXYGEN SATURATION: 93 % | HEART RATE: 87 BPM | BODY MASS INDEX: 24.62 KG/M2 | WEIGHT: 155 LBS | DIASTOLIC BLOOD PRESSURE: 88 MMHG | HEIGHT: 66.5 IN | RESPIRATION RATE: 18 BRPM

## 2018-08-08 DIAGNOSIS — R50.9 FEVER, UNSPECIFIED: ICD-10-CM

## 2018-08-08 PROCEDURE — 99214 OFFICE O/P EST MOD 30 MIN: CPT

## 2018-08-09 LAB
BASOPHILS # BLD AUTO: 0.03 K/UL
BASOPHILS NFR BLD AUTO: 0.4 %
CRP SERPL-MCNC: 0.78 MG/DL
EOSINOPHIL # BLD AUTO: 0.62 K/UL
EOSINOPHIL NFR BLD AUTO: 8 %
ERYTHROCYTE [SEDIMENTATION RATE] IN BLOOD BY WESTERGREN METHOD: 24 MM/HR
HCT VFR BLD CALC: 34.3 %
HGB BLD-MCNC: 11.4 G/DL
IMM GRANULOCYTES NFR BLD AUTO: 0.3 %
LYMPHOCYTES # BLD AUTO: 0.69 K/UL
LYMPHOCYTES NFR BLD AUTO: 8.9 %
MAN DIFF?: NORMAL
MCHC RBC-ENTMCNC: 30.3 PG
MCHC RBC-ENTMCNC: 33.2 GM/DL
MCV RBC AUTO: 91.2 FL
MONOCYTES # BLD AUTO: 0.81 K/UL
MONOCYTES NFR BLD AUTO: 10.5 %
NEUTROPHILS # BLD AUTO: 5.57 K/UL
NEUTROPHILS NFR BLD AUTO: 71.9 %
PLATELET # BLD AUTO: 459 K/UL
RBC # BLD: 3.76 M/UL
RBC # FLD: 13 %
WBC # FLD AUTO: 7.74 K/UL

## 2018-08-20 LAB
BACTERIA BLD CULT: NORMAL
BACTERIA BLD CULT: NORMAL

## 2018-11-29 ENCOUNTER — APPOINTMENT (OUTPATIENT)
Dept: GASTROENTEROLOGY | Facility: CLINIC | Age: 73
End: 2018-11-29
Payer: MEDICARE

## 2018-11-29 VITALS
HEIGHT: 66 IN | WEIGHT: 164 LBS | DIASTOLIC BLOOD PRESSURE: 76 MMHG | BODY MASS INDEX: 26.36 KG/M2 | HEART RATE: 83 BPM | TEMPERATURE: 98.1 F | SYSTOLIC BLOOD PRESSURE: 120 MMHG | OXYGEN SATURATION: 98 %

## 2018-11-29 DIAGNOSIS — Z86.39 PERSONAL HISTORY OF OTHER ENDOCRINE, NUTRITIONAL AND METABOLIC DISEASE: ICD-10-CM

## 2018-11-29 DIAGNOSIS — Z87.39 PERSONAL HISTORY OF OTHER DISEASES OF THE MUSCULOSKELETAL SYSTEM AND CONNECTIVE TISSUE: ICD-10-CM

## 2018-11-29 PROCEDURE — 99203 OFFICE O/P NEW LOW 30 MIN: CPT

## 2018-11-29 RX ORDER — PNV NO.95/FERROUS FUM/FOLIC AC 28MG-0.8MG
TABLET ORAL
Refills: 0 | Status: ACTIVE | COMMUNITY

## 2018-11-29 RX ORDER — UBIDECARENONE/VIT E ACET 100MG-5
CAPSULE ORAL
Refills: 0 | Status: ACTIVE | COMMUNITY

## 2018-11-29 RX ORDER — ASCORBIC ACID 500 MG
TABLET ORAL
Refills: 0 | Status: ACTIVE | COMMUNITY

## 2018-11-29 RX ORDER — GLUCOSAMINE/CHONDROITIN A/MSM 500-400 MG
500-400-300 TABLET ORAL
Refills: 0 | Status: ACTIVE | COMMUNITY

## 2018-11-29 RX ORDER — BERBERINE CHLOR/SEAWEED/CHROM 500-250 MG
CAPSULE ORAL
Refills: 0 | Status: ACTIVE | COMMUNITY

## 2018-12-31 ENCOUNTER — TRANSCRIPTION ENCOUNTER (OUTPATIENT)
Age: 73
End: 2018-12-31

## 2019-01-10 ENCOUNTER — APPOINTMENT (OUTPATIENT)
Dept: GASTROENTEROLOGY | Facility: AMBULATORY MEDICAL SERVICES | Age: 74
End: 2019-01-10

## 2019-01-17 ENCOUNTER — APPOINTMENT (OUTPATIENT)
Dept: GASTROENTEROLOGY | Facility: AMBULATORY MEDICAL SERVICES | Age: 74
End: 2019-01-17

## 2019-02-14 ENCOUNTER — APPOINTMENT (OUTPATIENT)
Dept: GASTROENTEROLOGY | Facility: AMBULATORY MEDICAL SERVICES | Age: 74
End: 2019-02-14
Payer: MEDICARE

## 2019-02-14 ENCOUNTER — OTHER (OUTPATIENT)
Age: 74
End: 2019-02-14

## 2019-02-14 PROCEDURE — 43239 EGD BIOPSY SINGLE/MULTIPLE: CPT

## 2019-02-25 ENCOUNTER — OTHER (OUTPATIENT)
Age: 74
End: 2019-02-25

## 2019-05-23 ENCOUNTER — LABORATORY RESULT (OUTPATIENT)
Age: 74
End: 2019-05-23

## 2019-06-06 ENCOUNTER — APPOINTMENT (OUTPATIENT)
Dept: RADIATION ONCOLOGY | Facility: CLINIC | Age: 74
End: 2019-06-06
Payer: MEDICARE

## 2019-06-06 VITALS
WEIGHT: 162.48 LBS | DIASTOLIC BLOOD PRESSURE: 80 MMHG | HEART RATE: 75 BPM | OXYGEN SATURATION: 99 % | BODY MASS INDEX: 26.23 KG/M2 | RESPIRATION RATE: 16 BRPM | SYSTOLIC BLOOD PRESSURE: 142 MMHG

## 2019-06-06 PROCEDURE — 99213 OFFICE O/P EST LOW 20 MIN: CPT

## 2019-06-06 NOTE — PHYSICAL EXAM
[Normal MARQUIS] : normal rectal tone, no rectal masses, prostate is smooth, symmetric and non-tender [Nl Inspection] : the anus was normal on inspection. [Nl Sphincter Tone] : normal sphincter tone [Normal Male] : prostate smooth, symmetric with no modularity or induration [Normal] : normal skin color and pigmentation and no rash [Prostate Hard Area Or Nodule Bilaterally] : had no palpable nodules [Prostate Tenderness] : was not tender [Rectal Exam - Prostate] : was not indurated [Prostate Fluctuant] : was not fluctuant [FreeTextEntry1] : Prostate soft and benign

## 2019-06-06 NOTE — REVIEW OF SYSTEMS
[Negative] : Endocrine [Anal Pain: Grade 0] : Anal Pain: Grade 0 [Constipation: Grade 0] : Constipation: Grade 0 [Diarrhea: Grade 0] : Diarrhea: Grade 0 [Proctitis: Grade 0] : Proctitis: Grade 0 [Rectal Pain: Grade 0] : Rectal Pain: Grade 0 [Hematuria: Grade 0] : Hematuria: Grade 0 [Urinary Retention: Grade 0] : Urinary Retention: Grade 0 [Urinary Incontinence: Grade 0] : Urinary Incontinence: Grade 0  [Urinary Urgency: Grade 0] : Urinary Urgency: Grade 0 [Urinary Tract Pain: Grade 0] : Urinary Tract Pain: Grade 0 [Urinary Frequency: Grade 0] : Urinary Frequency: Grade 0 [Erectile Dysfunction: Grade 0] : Erectile Dysfunction: Grade 0 [Ejaculation Disorder: Grade 0] : Ejaculation Disorder: Grade 0 [Fatigue: Grade 0] : Fatigue: Grade 0 [Nocturia] : nocturia [IPSS Score (0-40): ___] : IPSS score: [unfilled] [EPIC-CP Score (0-60): ___] : EPIC-CP score: [unfilled] [Urinary Frequency] : no urinary frequency

## 2019-06-06 NOTE — DISEASE MANAGEMENT
[1] : T1 [c] : c [0] : M0 [II] : II [7(3+4)] : Trev Score 7(3+4) [Radiation Therapy] : Radiation Therapy [Treatment with radiation therapy] : Treatment with radiation therapy [LDR] : LDR [LDRDose] : 13810 [RadiationCompletedDate] : 9/28/11

## 2019-06-06 NOTE — HISTORY OF PRESENT ILLNESS
[FreeTextEntry1] : Mr. 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 Patient was initially on Active Surveillance until his grade changed on follow-up biopsy.  He underwent a radioactive seed implant of the prostate on 9/28/11. .  A total of 65 Pd-[id number] were placed delivering 127.4U. The patient tolerated the treatment well.  \par \par Recent PSA HX:\par \par 6/23/15  0.2\par 1/21/14  0.3\par \par Patient present here today for follow up.  recent PSA was 0.08 ng/ml done on 5/23/19. He report nocturia 1-2 times per night. He denies any other urinary or bowel issues. Has regular daily bowel function. Patient is sexually active.\par \par \par

## 2019-10-07 LAB
APPEARANCE: CLEAR
BILIRUBIN URINE: NEGATIVE
BLOOD URINE: NEGATIVE
COLOR: NORMAL
GLUCOSE QUALITATIVE U: NEGATIVE
KETONES URINE: NEGATIVE
LEUKOCYTE ESTERASE URINE: NEGATIVE
NITRITE URINE: NEGATIVE
PH URINE: 5
PROTEIN URINE: NEGATIVE
SPECIFIC GRAVITY URINE: 1.02
UROBILINOGEN URINE: NORMAL

## 2019-10-08 ENCOUNTER — APPOINTMENT (OUTPATIENT)
Dept: UROLOGY | Facility: CLINIC | Age: 74
End: 2019-10-08

## 2019-11-06 ENCOUNTER — APPOINTMENT (OUTPATIENT)
Dept: UROLOGY | Facility: CLINIC | Age: 74
End: 2019-11-06
Payer: MEDICARE

## 2019-11-06 PROCEDURE — 99214 OFFICE O/P EST MOD 30 MIN: CPT

## 2019-11-06 NOTE — PHYSICAL EXAM
[General Appearance - Well Developed] : well developed [Normal Appearance] : normal appearance [Heart Rate And Rhythm] : Heart rate and rhythm were normal [Edema] : no peripheral edema [] : no respiratory distress [Abdomen Soft] : soft [Abdomen Tenderness] : non-tender [Costovertebral Angle Tenderness] : no ~M costovertebral angle tenderness [Penis Abnormality] : normal circumcised penis [Testes Tenderness] : no tenderness of the testes [Normal Station and Gait] : the gait and station were normal for the patient's age [Skin Color & Pigmentation] : normal skin color and pigmentation [No Focal Deficits] : no focal deficits [Oriented To Time, Place, And Person] : oriented to person, place, and time [Affect] : the affect was normal [No Palpable Adenopathy] : no palpable adenopathy [Cervical Lymph Nodes Enlarged Posterior Bilaterally] : posterior cervical [Cervical Lymph Nodes Enlarged Anterior Bilaterally] : anterior cervical [FreeTextEntry1] : little sebaceous cyst on the scrotum, firm area in the tail of the right epididymis, separate from the testicle, possible paratesticular mass, possible mesothelia bhargav, hydrocele on the left, no inguinal hernia

## 2019-11-06 NOTE — ASSESSMENT
[FreeTextEntry1] : Álvaro Estevez is a 73 y/o male patient who presents today for an initial evaluation. Today he reports a Hx of prostate cancer. He underwent seed implantation to treat the diagnosed cancer. It has been 10 years since his treatment and he states that he is doing well. His most recent PSA from 5/23/19 was 0.08 ng/mL. Today he reports experiencing increased urinary frequency. He will urinate 5-6 x during the day and 1-2 x at night. Additionally abdominal pain was experienced in May 2019. A CT scan was ordered as a result. The results determined that there is mild prostatic enlargement with probable changes of chronic bladder outlet obstruction. S/p brachytherapy. Probable moderate to large left hydrocele. Further evaluation with scrotal ultrasound recommended. Probably cyst in the left kidney which could also be confirmed with ultrasound. Today he denies gross hematuria. \par \par The patient produced a urine sample which will be sent for urinalysis, urine cytology and urine culture. \par Blood work today included comprehensive metabolic panel, CBC, PSA and testosterone. \par \par A scrotal ultrasound is ordered today to evaluate a right sided paratesticular mass and left hydrocele. \par \par He is to follow up once the image is obtained to discuss the results.

## 2019-11-06 NOTE — LETTER BODY
[Dear  ___] : Dear  [unfilled], [Consult Letter:] : I had the pleasure of evaluating your patient, [unfilled]. [Please see my note below.] : Please see my note below. [Consult Closing:] : Thank you very much for allowing me to participate in the care of this patient.  If you have any questions, please do not hesitate to contact me. [Sincerely,] : Sincerely, [FreeTextEntry2] : David Monroy DO\par [address] \[place] [address] [FreeTextEntry1] : Álvaro Estevez is a 75 y/o male patient who presents today for an initial evaluation. Today he reports a Hx of prostate cancer. He underwent seed implantation to treat the diagnosed cancer. It has been 10 years since his treatment and he states that he is doing well. His most recent PSA from 5/23/19 was 0.08 ng/mL. Today he reports experiencing increased urinary frequency. He will urinate 5-6 x during the day and 1-2 x at night. Additionally abdominal pain was experienced in May 2019. A CT scan was ordered as a result. The results determined that there is mild prostatic enlargement with probable changes of chronic bladder outlet obstruction. S/p brachytherapy. Probable moderate to large left hydrocele. Further evaluation with scrotal ultrasound recommended. Probably cyst in the left kidney which could also be confirmed with ultrasound. Today he denies gross hematuria. \par \par The patient produced a urine sample which will be sent for urinalysis, urine cytology and urine culture. \par Blood work today included comprehensive metabolic panel, CBC, PSA and testosterone. \par \par A scrotal ultrasound is ordered today to evaluate a right sided paratesticular mass and left hydrocele. \par \par He is to follow up once the image is obtained to discuss the results.  [FreeTextEntry3] : Roger Thomson M.D. PhD

## 2019-11-06 NOTE — HISTORY OF PRESENT ILLNESS
[FreeTextEntry1] : Álvaro Estevez is a 75 y/o male patient who presents today for an initial evaluation. Today he reports a Hx of prostate cancer. He underwent seed implantation to treat the diagnosed cancer. It has been 10 years since his treatment and he states that he is doing well. His most recent PSA from 5/23/19 was 0.08 ng/mL. Today he reports experiencing increased urinary frequency. He will urinate 5-6 x during the day and 1-2 x at night. Additionally abdominal pain was experienced in May 2019. A CT scan was ordered as a result. The results determined that there is mild prostatic enlargement with probable changes of chronic bladder outlet obstruction. S/p brachytherapy. Probable moderate to large left hydrocele. Further evaluation with scrotal ultrasound recommended. Probably cyst in the left kidney which could also be confirmed with ultrasound. Today he denies gross hematuria.

## 2019-11-06 NOTE — ADDENDUM
[FreeTextEntry1] : This note was authored by Ramon Huston working as scribe for Dr. Roger Thomson. I, Dr. Roger Thomson, have reviewed the content of this note and confirm it is true and accurate. I personally performed the history and physical examination and made all the decisions.\par 11/6/19

## 2019-11-18 ENCOUNTER — FORM ENCOUNTER (OUTPATIENT)
Age: 74
End: 2019-11-18

## 2019-11-19 ENCOUNTER — APPOINTMENT (OUTPATIENT)
Dept: CT IMAGING | Facility: CLINIC | Age: 74
End: 2019-11-19
Payer: MEDICARE

## 2019-11-19 ENCOUNTER — OUTPATIENT (OUTPATIENT)
Dept: OUTPATIENT SERVICES | Facility: HOSPITAL | Age: 74
LOS: 1 days | End: 2019-11-19
Payer: MEDICARE

## 2019-11-19 ENCOUNTER — TRANSCRIPTION ENCOUNTER (OUTPATIENT)
Age: 74
End: 2019-11-19

## 2019-11-19 ENCOUNTER — APPOINTMENT (OUTPATIENT)
Dept: ULTRASOUND IMAGING | Facility: CLINIC | Age: 74
End: 2019-11-19
Payer: MEDICARE

## 2019-11-19 DIAGNOSIS — N43.3 HYDROCELE, UNSPECIFIED: ICD-10-CM

## 2019-11-19 PROCEDURE — 76870 US EXAM SCROTUM: CPT

## 2019-11-19 PROCEDURE — 76870 US EXAM SCROTUM: CPT | Mod: 26

## 2019-11-19 PROCEDURE — 74178 CT ABD&PLV WO CNTR FLWD CNTR: CPT | Mod: 26

## 2019-11-19 PROCEDURE — 74178 CT ABD&PLV WO CNTR FLWD CNTR: CPT

## 2019-11-25 ENCOUNTER — APPOINTMENT (OUTPATIENT)
Dept: UROLOGY | Facility: CLINIC | Age: 74
End: 2019-11-25
Payer: MEDICARE

## 2019-11-25 PROCEDURE — 99214 OFFICE O/P EST MOD 30 MIN: CPT

## 2019-11-25 NOTE — PHYSICAL EXAM
[General Appearance - Well Developed] : well developed [Normal Appearance] : normal appearance [Abdomen Soft] : soft [Abdomen Tenderness] : non-tender [Costovertebral Angle Tenderness] : no ~M costovertebral angle tenderness [Skin Color & Pigmentation] : normal skin color and pigmentation [Heart Rate And Rhythm] : Heart rate and rhythm were normal [Edema] : no peripheral edema [] : no respiratory distress [Oriented To Time, Place, And Person] : oriented to person, place, and time [Affect] : the affect was normal [Normal Station and Gait] : the gait and station were normal for the patient's age [No Palpable Adenopathy] : no palpable adenopathy [No Focal Deficits] : no focal deficits [Cervical Lymph Nodes Enlarged Posterior Bilaterally] : posterior cervical [Cervical Lymph Nodes Enlarged Anterior Bilaterally] : anterior cervical [FreeTextEntry1] : no suprapubic tenderness

## 2019-11-25 NOTE — ASSESSMENT
[FreeTextEntry1] : \par 11/25/19: Pt is here today for follow up visit to discuss results. Pt had CT abdomen and pelvic on 11/19/19 which showed 2.3 cm simple left renal cyst. US of Scrotum on 11/19/19 showed right testis 4.4 x 2.1 x 2.5 cm. Mild degree of microlithiasis identified without evidence of mass. Normal arterial and venous blood flow pattern. Right hydrocele. Left testis 4.4 x 2.3 x 2.5 cm with mild degree of microlithiasis also seen normal arterial and venous blood flow pattern. Pt has no other complaints today. \par \par Pt will follow up 6 months. \par \par

## 2019-11-25 NOTE — HISTORY OF PRESENT ILLNESS
[FreeTextEntry1] : Álvaro Estevez is a 75 y/o male patient who presents today for an initial evaluation. Today he reports a Hx of prostate cancer. He underwent seed implantation to treat the diagnosed cancer. It has been 10 years since his treatment and he states that he is doing well. His most recent PSA from 5/23/19 was 0.08 ng/mL. Today he reports experiencing increased urinary frequency. He will urinate 5-6 x during the day and 1-2 x at night. Additionally abdominal pain was experienced in May 2019. A CT scan was ordered as a result. The results determined that there is mild prostatic enlargement with probable changes of chronic bladder outlet obstruction. S/p brachytherapy. Probable moderate to large left hydrocele. Further evaluation with scrotal ultrasound recommended. Probably cyst in the left kidney which could also be confirmed with ultrasound. Today he denies gross hematuria. \par \par 11/25/19: Pt is here today for follow up visit to discuss results. Pt had CT abdomen and pelvic on 11/19/19 which showed 2.3 cm simple left renal cyst. US of Scrotum on 11/19/19 showed right testis 4.4 x 2.1 x 2.5 cm. Mild degree of microlithiasis identified without evidence of mass. Normal arterial and venous blood flow pattern. Right hydrocele. Left testis 4.4 x 2.3 x 2.5 cm with mild degree of microlithiasis also seen normal arterial and venous blood flow pattern.

## 2019-12-02 ENCOUNTER — APPOINTMENT (OUTPATIENT)
Dept: UROLOGY | Facility: CLINIC | Age: 74
End: 2019-12-02

## 2020-02-12 NOTE — ED PROVIDER NOTE - CONSTITUTIONAL [+], MLM
FEVER Closure 2 Information: This tab is for additional flaps and grafts, including complex repair and grafts and complex repair and flaps. You can also specify a different location for the additional defect, if the location is the same you do not need to select a new one. We will insert the automated text for the repair you select below just as we do for solitary flaps and grafts. Please note that at this time if you select a location with a different insurance zone you will need to override the ICD10 and CPT if appropriate.

## 2020-03-16 NOTE — ED ADULT NURSE NOTE - PAIN RATING/NUMBER SCALE (0-10): REST
0 S/w Pharmacist Sebastien at [facility] who states that patient does not have traditional MCR and that a PA is needed due to him not being on their preferred brand. PA initiated and marked as STAT.

## 2020-06-02 ENCOUNTER — OUTPATIENT (OUTPATIENT)
Dept: OUTPATIENT SERVICES | Facility: HOSPITAL | Age: 75
LOS: 1 days | Discharge: HOME | End: 2020-06-02

## 2020-06-08 LAB — PSA SERPL-MCNC: 0.07 NG/ML

## 2020-06-10 ENCOUNTER — APPOINTMENT (OUTPATIENT)
Dept: RADIATION ONCOLOGY | Facility: CLINIC | Age: 75
End: 2020-06-10
Payer: MEDICARE

## 2020-06-10 PROCEDURE — 99213 OFFICE O/P EST LOW 20 MIN: CPT | Mod: 95

## 2020-06-10 NOTE — DISEASE MANAGEMENT
[1] : T1 [c] : c [0] : M0 [7(3+4)] : Trev Score 7(3+4) [II] : II [Radiation Therapy] : Radiation Therapy [Treatment with radiation therapy] : Treatment with radiation therapy [LDR] : LDR [RadiationCompletedDate] : 9/28/11 [LDRDose] : [id number]

## 2020-06-10 NOTE — PHYSICAL EXAM
[Normal MARQUIS] : normal rectal tone, no rectal masses, prostate is smooth, symmetric and non-tender [Normal] : normal external exam, normal spincter tone; no palpable masses [Normal Male] : prostate smooth, symmetric with no modularity or induration [Prostate Hard Area Or Nodule Bilaterally] : had no palpable nodules [Rectal Exam - Prostate] : was not indurated [Prostate Tenderness] : was not tender [Prostate Fluctuant] : was not fluctuant [FreeTextEntry1] : Prostate soft and benign

## 2020-06-10 NOTE — REVIEW OF SYSTEMS
[IPSS Score (0-40): ___] : IPSS score: [unfilled] [EPIC-CP Score (0-60): ___] : EPIC-CP score: [unfilled] [Negative] : Endocrine [Anal Pain: Grade 0] : Anal Pain: Grade 0 [Constipation: Grade 0] : Constipation: Grade 0 [Proctitis: Grade 0] : Proctitis: Grade 0 [Diarrhea: Grade 0] : Diarrhea: Grade 0 [Rectal Pain: Grade 0] : Rectal Pain: Grade 0 [Hematuria: Grade 0] : Hematuria: Grade 0 [Urinary Retention: Grade 0] : Urinary Retention: Grade 0 [Urinary Incontinence: Grade 0] : Urinary Incontinence: Grade 0  [Urinary Tract Pain: Grade 0] : Urinary Tract Pain: Grade 0 [Urinary Frequency: Grade 0] : Urinary Frequency: Grade 0 [Urinary Urgency: Grade 0] : Urinary Urgency: Grade 0 [Fatigue: Grade 0] : Fatigue: Grade 0 [Nocturia] : nocturia [Ejaculation Disorder: Grade 1 - Diminished ejaculation] : Ejaculation Disorder: Grade 1 - Diminished ejaculation  [Erectile Dysfunction: Grade 1 - Decrease in erectile function (frequency or rigidity of erections) but intervention not indicated (e.g., medication or use of mechanical device, penile pump)] : Erectile Dysfunction: Grade 1 - Decrease in erectile function (frequency or rigidity of erections) but intervention not indicated (e.g., medication or use of mechanical device, penile pump) [Urinary Frequency] : no urinary frequency [FreeTextEntry7] : Not sexuallyactive

## 2020-06-10 NOTE — HISTORY OF PRESENT ILLNESS
[Home] : at home, [unfilled] , at the time of the visit. [Medical Office: ([facility])___] : at the medical office located in  [Spouse] : spouse [Verbal consent obtained from patient] : the patient, [unfilled] [FreeTextEntry1] : Mr. 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 Patient was initially on Active Surveillance until his grade changed on follow-up biopsy.  He underwent a radioactive seed implant of the prostate on 9/28/11. .  A total of 65 Pd-103 were placed delivering 127.4U. The patient tolerated the treatment well.  \par \par Recent PSA HX:\par \par 6/23/15  0.2\par 1/21/14  0.3\par 6/20/18  0.09\par 5/23/19  0.08\par 6/2/20    0.07\par \par Patient present here today for follow up.  Patient had open heart surgery in January. He report one time nocturia per night and denies any other urinary or bowel issue. He is not sexually active. Patient report he had right hydrocele, he has a follow up appointment with Dr. Thomson on 6/11/20.\par \par

## 2020-06-11 ENCOUNTER — APPOINTMENT (OUTPATIENT)
Dept: UROLOGY | Facility: CLINIC | Age: 75
End: 2020-06-11
Payer: MEDICARE

## 2020-06-11 ENCOUNTER — LABORATORY RESULT (OUTPATIENT)
Age: 75
End: 2020-06-11

## 2020-06-11 VITALS
BODY MASS INDEX: 26.03 KG/M2 | HEIGHT: 66 IN | SYSTOLIC BLOOD PRESSURE: 146 MMHG | DIASTOLIC BLOOD PRESSURE: 87 MMHG | RESPIRATION RATE: 18 BRPM | HEART RATE: 84 BPM | WEIGHT: 162 LBS

## 2020-06-11 VITALS — TEMPERATURE: 98.7 F

## 2020-06-11 PROCEDURE — 99214 OFFICE O/P EST MOD 30 MIN: CPT

## 2020-06-11 NOTE — HISTORY OF PRESENT ILLNESS
[FreeTextEntry1] : Álvaro Estevez is a 75 y/o male patient who presents today for an initial evaluation. Today he reports a Hx of prostate cancer. He underwent seed implantation to treat the diagnosed cancer. It has been 10 years since his treatment and he states that he is doing well. His most recent PSA from 5/23/19 was 0.08 ng/mL. Today he reports experiencing increased urinary frequency. He will urinate 5-6 x during the day and 1-2 x at night. Additionally abdominal pain was experienced in May 2019. A CT scan was ordered as a result. The results determined that there is mild prostatic enlargement with probable changes of chronic bladder outlet obstruction. S/p brachytherapy. Probable moderate to large left hydrocele. Further evaluation with scrotal ultrasound recommended. Probably cyst in the left kidney which could also be confirmed with ultrasound. Today he denies gross hematuria. \par 11/25/19: Pt is here today for follow up visit to discuss results. Pt had CT abdomen and pelvic on 11/19/19 which showed 2.3 cm simple left renal cyst. US of Scrotum on 11/19/19 showed right testis 4.4 x 2.1 x 2.5 cm. Mild degree of microlithiasis identified without evidence of mass. Normal arterial and venous blood flow pattern. Right hydrocele. Left testis 4.4 x 2.3 x 2.5 cm with mild degree of microlithiasis also seen normal arterial and venous blood flow pattern.\par \par 06/11/2020: Patient presents today for a follow up. PSA was 0.07 on 06/02/2020. Pt is on Warfarin. Nocturia x1-2. Denies chest pain, constipation, dysuria, hematuria and urgency. Pt had valve repair surgery in December 2019. Had bilateral hips replacements. Pt has not been sexually active because his girlfriend is a paraplegic. Hydrocele is not bothering him. Pt had radiation in 2011 for [place] 3+4=7 cancer of the prostate.

## 2020-06-11 NOTE — ASSESSMENT
[FreeTextEntry1] : 11/25/19: Pt is here today for follow up visit to discuss results. Pt had CT abdomen and pelvic on 11/19/19 which showed 2.3 cm simple left renal cyst. US of Scrotum on 11/19/19 showed right testis 4.4 x 2.1 x 2.5 cm. Mild degree of microlithiasis identified without evidence of mass. Normal arterial and venous blood flow pattern. Right hydrocele. Left testis 4.4 x 2.3 x 2.5 cm with mild degree of microlithiasis also seen normal arterial and venous blood flow pattern. Pt has no other complaints today. Pt will follow up 6 months. \par \par 06/11/2020: Patient presents today for a follow up. PSA was 0.07 on 06/02/2020. Pt is on Warfarin. Nocturia x1-2. Denies chest pain, constipation, dysuria, hematuria and urgency. Pt had valve repair surgery in December 2019. Had bilateral hips replacements. Pt has not been sexually active because his girlfriend is a paraplegic. Hydrocele is not bothering him. Pt had radiation in 2011 for [place] 3+4=7 cancer of the prostate. \par \par Pt will provide a urine sample today for culture, cytology and analysis. \par \par Discussed with pt possible removal of hydrocele and recovery possess. \par \par Scheduled for US scrotum. Pt will RTO the same day for results. Advised to have it done before the end of summer to avoid the spike of coronavirus.

## 2020-06-11 NOTE — ADDENDUM
[FreeTextEntry1] : This note was authored by Ingrid Chandler working as scribe for Dr. Roger Thomson. I, Dr. Roger Thomson, have reviewed the content of this note and confirm it is true and accurate. I personally performed the history and physical examination and made all the decisions.\par 06/11/2020

## 2020-06-11 NOTE — PHYSICAL EXAM
[General Appearance - Well Developed] : well developed [Normal Appearance] : normal appearance [Abdomen Soft] : soft [Abdomen Tenderness] : non-tender [Costovertebral Angle Tenderness] : no ~M costovertebral angle tenderness [Skin Color & Pigmentation] : normal skin color and pigmentation [Heart Rate And Rhythm] : Heart rate and rhythm were normal [Edema] : no peripheral edema [Oriented To Time, Place, And Person] : oriented to person, place, and time [] : no respiratory distress [Affect] : the affect was normal [Normal Station and Gait] : the gait and station were normal for the patient's age [No Focal Deficits] : no focal deficits [No Palpable Adenopathy] : no palpable adenopathy [Cervical Lymph Nodes Enlarged Posterior Bilaterally] : posterior cervical [Cervical Lymph Nodes Enlarged Anterior Bilaterally] : anterior cervical [Urethral Meatus] : meatus normal [Urinary Bladder Findings] : the bladder was normal on palpation [Scrotum] : the scrotum was normal [Testes Mass (___cm)] : there were no testicular masses [No Prostate Nodules] : no prostate nodules [FreeTextEntry1] : Sebaceous cyst in the scrotal skin left side. Testicle on the left is non tender. Right has hydrocele. Lower pole firmness of epididymis.

## 2020-06-14 LAB
APPEARANCE: ABNORMAL
BILIRUBIN URINE: NEGATIVE
BLOOD URINE: NEGATIVE
COLOR: YELLOW
GLUCOSE QUALITATIVE U: NEGATIVE
KETONES URINE: NEGATIVE
LEUKOCYTE ESTERASE URINE: NEGATIVE
NITRITE URINE: NEGATIVE
PH URINE: 5.5
PROTEIN URINE: NORMAL
SPECIFIC GRAVITY URINE: 1.03
UROBILINOGEN URINE: NORMAL

## 2020-06-15 LAB — URINE CYTOLOGY: NORMAL

## 2020-10-01 ENCOUNTER — APPOINTMENT (OUTPATIENT)
Dept: ULTRASOUND IMAGING | Facility: IMAGING CENTER | Age: 75
End: 2020-10-01
Payer: MEDICARE

## 2020-10-01 ENCOUNTER — OUTPATIENT (OUTPATIENT)
Dept: OUTPATIENT SERVICES | Facility: HOSPITAL | Age: 75
LOS: 1 days | End: 2020-10-01
Payer: MEDICARE

## 2020-10-01 ENCOUNTER — APPOINTMENT (OUTPATIENT)
Dept: UROLOGY | Facility: CLINIC | Age: 75
End: 2020-10-01
Payer: MEDICARE

## 2020-10-01 VITALS — TEMPERATURE: 97.9 F

## 2020-10-01 DIAGNOSIS — M54.9 DORSALGIA, UNSPECIFIED: ICD-10-CM

## 2020-10-01 DIAGNOSIS — N43.3 HYDROCELE, UNSPECIFIED: ICD-10-CM

## 2020-10-01 DIAGNOSIS — N40.1 BENIGN PROSTATIC HYPERPLASIA WITH LOWER URINARY TRACT SYMPMS: ICD-10-CM

## 2020-10-01 DIAGNOSIS — N50.89 OTHER SPECIFIED DISORDERS OF THE MALE GENITAL ORGANS: ICD-10-CM

## 2020-10-01 DIAGNOSIS — N13.8 BENIGN PROSTATIC HYPERPLASIA WITH LOWER URINARY TRACT SYMPMS: ICD-10-CM

## 2020-10-01 DIAGNOSIS — M47.816 SPONDYLOSIS W/OUT MYELOPATHY OR RADICULOPATHY, LUMBAR REGION: ICD-10-CM

## 2020-10-01 DIAGNOSIS — C61 MALIGNANT NEOPLASM OF PROSTATE: ICD-10-CM

## 2020-10-01 DIAGNOSIS — N28.1 CYST OF KIDNEY, ACQUIRED: ICD-10-CM

## 2020-10-01 PROCEDURE — 99214 OFFICE O/P EST MOD 30 MIN: CPT

## 2020-10-01 PROCEDURE — 76870 US EXAM SCROTUM: CPT | Mod: 26

## 2020-10-01 PROCEDURE — 76870 US EXAM SCROTUM: CPT

## 2020-10-01 NOTE — PHYSICAL EXAM
[General Appearance - Well Developed] : well developed [Normal Appearance] : normal appearance [General Appearance - In No Acute Distress] : no acute distress [Abdomen Soft] : soft [Abdomen Tenderness] : non-tender [Skin Color & Pigmentation] : normal skin color and pigmentation [Edema] : no peripheral edema [] : no respiratory distress [Respiration, Rhythm And Depth] : normal respiratory rhythm and effort [Exaggerated Use Of Accessory Muscles For Inspiration] : no accessory muscle use [Oriented To Time, Place, And Person] : oriented to person, place, and time [Affect] : the affect was normal [Mood] : the mood was normal [Not Anxious] : not anxious [Normal Station and Gait] : the gait and station were normal for the patient's age [No Focal Deficits] : no focal deficits

## 2020-10-02 PROBLEM — M54.9 BACK PAIN, ACUTE: Status: ACTIVE | Noted: 2018-08-09

## 2020-10-02 PROBLEM — N50.89 EPIDIDYMAL MASS: Status: ACTIVE | Noted: 2020-06-11

## 2020-10-02 PROBLEM — N28.1 RENAL CYST: Status: ACTIVE | Noted: 2019-11-06

## 2020-10-02 PROBLEM — N43.3 LEFT HYDROCELE: Status: ACTIVE | Noted: 2019-11-06

## 2020-10-02 NOTE — ADDENDUM
[FreeTextEntry1] : I, Betty Quilesin, acted solely as a scribe for Dr. Roger Thomson on this date 10/01/2020.\par \par All medical record entries made by the Scribe were at my, Dr. Roger Thomson, direction and personally dictated by me on 10/01/2020. I have reviewed the chart and agree that the record accurately reflects my personal performance of the history, physical exam, assessment and plan.  I have also personally directed, reviewed and agreed with the chart.

## 2020-10-02 NOTE — HISTORY OF PRESENT ILLNESS
[FreeTextEntry1] : Álvaro Estevez is a 76 y/o male patient who presents today for an initial evaluation. Today he reports a Hx of prostate cancer. He underwent seed implantation to treat the diagnosed cancer. It has been 10 years since his treatment and he states that he is doing well. His most recent PSA from 5/23/19 was 0.08 ng/mL. Today he reports experiencing increased urinary frequency. He will urinate 5-6 x during the day and 1-2 x at night. Additionally abdominal pain was experienced in May 2019. A CT scan was ordered as a result. The results determined that there is mild prostatic enlargement with probable changes of chronic bladder outlet obstruction. S/p brachytherapy. Probable moderate to large left hydrocele. Further evaluation with scrotal ultrasound recommended. Probably cyst in the left kidney which could also be confirmed with ultrasound. Today he denies gross hematuria. \par 11/25/19: Pt is here today for follow up visit to discuss results. Pt had CT abdomen and pelvic on 11/19/19 which showed 2.3 cm simple left renal cyst. US of Scrotum on 11/19/19 showed right testis 4.4 x 2.1 x 2.5 cm. Mild degree of microlithiasis identified without evidence of mass. Normal arterial and venous blood flow pattern. Right hydrocele. Left testis 4.4 x 2.3 x 2.5 cm with mild degree of microlithiasis also seen normal arterial and venous blood flow pattern.\par \par 06/11/2020: Patient presents today for a follow up. PSA was 0.07 on 06/02/2020. Pt is on Warfarin. Nocturia x1-2. Denies chest pain, constipation, dysuria, hematuria and urgency. Pt had valve repair surgery in December 2019. Had bilateral hips replacements. Pt has not been sexually active because his girlfriend is a paraplegic. Hydrocele is not bothering him. Pt had radiation in 2011 for [place] 3+4=7 cancer of the prostate. \par \par 10/01/2020: Patient presents today for follow up on scrotal US results. Shows hydrocele bilaterally. Pt is not bothered by hydroceles. Last PSA on 6/2/20 was 0.07. Urination is good. No new urinary complaints.

## 2020-10-02 NOTE — ASSESSMENT
[FreeTextEntry1] : 11/25/19: Pt is here today for follow up visit to discuss results. Pt had CT abdomen and pelvic on 11/19/19 which showed 2.3 cm simple left renal cyst. US of Scrotum on 11/19/19 showed right testis 4.4 x 2.1 x 2.5 cm. Mild degree of microlithiasis identified without evidence of mass. Normal arterial and venous blood flow pattern. Right hydrocele. Left testis 4.4 x 2.3 x 2.5 cm with mild degree of microlithiasis also seen normal arterial and venous blood flow pattern. Pt has no other complaints today. Pt will follow up 6 months. \par \par 06/11/2020: Patient presents today for a follow up. PSA was 0.07 on 06/02/2020. Pt is on Warfarin. Nocturia x1-2. Denies chest pain, constipation, dysuria, hematuria and urgency. Pt had valve repair surgery in December 2019. Had bilateral hips replacements. Pt has not been sexually active because his girlfriend is a paraplegic. Hydrocele is not bothering him. Pt had radiation in 2011 for [place] 3+4=7 cancer of the prostate. Discussed with pt possible removal of hydrocele and recovery possess. Scheduled for US scrotum. \par \par 10/01/2020: Patient presents today for follow up on scrotal US results. Shows hydrocele bilaterally. Pt is not bothered by hydroceles. Last PSA on 6/2/20 was 0.07. Urination is good. No new urinary complaints. \par \par Discussed possible removal of hydrocele but pt feels well for now. \par \par Pt will RTO in 3 months for repeat scrotal US.

## 2021-11-25 ENCOUNTER — TRANSCRIPTION ENCOUNTER (OUTPATIENT)
Age: 76
End: 2021-11-25

## 2022-01-21 ENCOUNTER — TRANSCRIPTION ENCOUNTER (OUTPATIENT)
Age: 77
End: 2022-01-21

## 2022-04-18 ENCOUNTER — NON-APPOINTMENT (OUTPATIENT)
Age: 77
End: 2022-04-18

## 2022-04-22 ENCOUNTER — TRANSCRIPTION ENCOUNTER (OUTPATIENT)
Age: 77
End: 2022-04-22

## 2022-04-25 ENCOUNTER — APPOINTMENT (OUTPATIENT)
Dept: GASTROENTEROLOGY | Facility: CLINIC | Age: 77
End: 2022-04-25
Payer: MEDICARE

## 2022-04-25 VITALS
WEIGHT: 168 LBS | DIASTOLIC BLOOD PRESSURE: 78 MMHG | HEIGHT: 66 IN | HEART RATE: 76 BPM | BODY MASS INDEX: 27 KG/M2 | SYSTOLIC BLOOD PRESSURE: 132 MMHG | OXYGEN SATURATION: 98 %

## 2022-04-25 DIAGNOSIS — Z78.9 OTHER SPECIFIED HEALTH STATUS: ICD-10-CM

## 2022-04-25 DIAGNOSIS — K21.9 GASTRO-ESOPHAGEAL REFLUX DISEASE W/OUT ESOPHAGITIS: ICD-10-CM

## 2022-04-25 PROCEDURE — 99203 OFFICE O/P NEW LOW 30 MIN: CPT

## 2022-04-25 NOTE — PHYSICAL EXAM
[General Appearance - Alert] : alert [General Appearance - In No Acute Distress] : in no acute distress [Sclera] : the sclera and conjunctiva were normal [Outer Ear] : the ears and nose were normal in appearance [Neck Appearance] : the appearance of the neck was normal [] : no respiratory distress [Exaggerated Use Of Accessory Muscles For Inspiration] : no accessory muscle use [Auscultation Breath Sounds / Voice Sounds] : lungs were clear to auscultation bilaterally [Heart Rate And Rhythm] : heart rate was normal and rhythm regular [Heart Sounds] : normal S1 and S2 [Murmurs] : no murmurs [Edema] : there was no peripheral edema [Abdomen Soft] : soft [Abdomen Tenderness] : non-tender [Abdomen Mass (___ Cm)] : no abdominal mass palpated [No Spinal Tenderness] : no spinal tenderness [Involuntary Movements] : no involuntary movements were seen [Skin Color & Pigmentation] : normal skin color and pigmentation [No Focal Deficits] : no focal deficits [Oriented To Time, Place, And Person] : oriented to person, place, and time [Affect] : the affect was normal [Mood] : the mood was normal

## 2022-04-25 NOTE — HISTORY OF PRESENT ILLNESS
[Heartburn] : improved heartburn [Nausea] : denies nausea [Vomiting] : denies vomiting [Diarrhea] : denies diarrhea [Constipation] : denies constipation [Yellow Skin Or Eyes (Jaundice)] : denies jaundice [Abdominal Pain] : denies abdominal pain [Abdominal Swelling] : denies abdominal swelling [Wt Loss ___ Lbs] : no recent weight loss [de-identified] : This is a 76 year old male with GERD, HTN, HLD, prostate cancer, here for evaluation of GERD. \par \par Patient has seen Dr. Brock from GI in 2019 for GERD. Usually he has less than one episode of reflux a week. 3 weeks ago he forgot his Nexium, he had nausea/vomiting and severe dyspepsia. He took his Nexium again, and he now feels much better and his dyspepsia is resolved. He made the appointment when he was having severe reflux, which is now resolved. He denies dysphagia. He takes the Nexium 40mg daily around noon time. He drinks red wine and eats a red sauces. He has more reflux when he's laying down, not after eating. No abnormal weight loss. He has been on Nexium for many years. He goes to bed around 8-10:30PM, and last eats around 5-6 PM. He reports no bowel habit changes, no diarrhea/melena/hematochezia. His last colonoscopy was 3 years ago.\par \par 2/14/19 EGD:\par Irregular SC junction. Single short tongue of gastric mucosa extending up esophagus 1cm, biopsies taken. Mild gastritis. Normal duodenum.\par Path: No H pylori. Distal esophagus biopsy: Gastroesophageal mucosa with chronic inflammation and intestinal metaplasia. 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 Clinical correlation is suggested.

## 2022-04-25 NOTE — CONSULT LETTER
[Dear  ___] : Dear  [unfilled], [Courtesy Letter:] : I had the pleasure of seeing your patient, [unfilled], in my office today. [Please see my note below.] : Please see my note below. [Consult Closing:] : Thank you very much for allowing me to participate in the care of this patient.  If you have any questions, please do not hesitate to contact me. [Sincerely,] : Sincerely, [FreeTextEntry2] : Dr. Jaida Constantino [FreeTextEntry3] : Joe Bridges MD\par [facility]\par  of Medicine, [facility]\par Tel: [phone]\par Fax: [phone]\par

## 2022-04-25 NOTE — ASSESSMENT
[FreeTextEntry1] : Impression:\par 1. Chronic GERD - had worsened symptoms 3 weeks ago off PPI, now resolving\par \par Plan:\par -Discussed the pathophysiology of GERD\par -Advised lifestyle modifications for reflux. Asked patient to avoid eating 3 hours before going to bed or laying down, and to elevate the head of his bed.\par -He had no reported hiatal hernia on last EGD in 2019\par -Discussed that since the biopsy suggested intestinal metaplasia of the tongue salmon colored mucosa, he might have Ramos's and should continue PPI; discussed that after the age of 75, we would individualize and usually stop screening for Ramos's/colon cancer \par -Discussed the long term potential side effects of PPI use, including that of pneumonia, osteoporosis\par -Otherwise, can use pepcid PRN which will work faster than PPI on an as needed basis for the reflux\par \par RTC PRN

## 2022-05-03 ENCOUNTER — NON-APPOINTMENT (OUTPATIENT)
Age: 77
End: 2022-05-03

## 2022-07-31 ENCOUNTER — NON-APPOINTMENT (OUTPATIENT)
Age: 77
End: 2022-07-31

## 2022-08-30 ENCOUNTER — APPOINTMENT (OUTPATIENT)
Dept: SURGERY | Facility: CLINIC | Age: 77
End: 2022-08-30

## 2022-08-30 VITALS
WEIGHT: 153 LBS | BODY MASS INDEX: 24.01 KG/M2 | SYSTOLIC BLOOD PRESSURE: 147 MMHG | DIASTOLIC BLOOD PRESSURE: 77 MMHG | HEIGHT: 67 IN | TEMPERATURE: 97.2 F | RESPIRATION RATE: 17 BRPM | OXYGEN SATURATION: 98 % | HEART RATE: 79 BPM

## 2022-08-30 DIAGNOSIS — K40.90 UNILATERAL INGUINAL HERNIA, W/OUT OBSTRUCTION OR GANGRENE, NOT SPECIFIED AS RECURRENT: ICD-10-CM

## 2022-08-30 PROCEDURE — 99204 OFFICE O/P NEW MOD 45 MIN: CPT

## 2022-08-30 NOTE — PHYSICAL EXAM
[Normal Breath Sounds] : Normal breath sounds [Normal Heart Sounds] : normal heart sounds [No HSM] : no hepatosplenomegaly [No Rash or Lesion] : No rash or lesion [Alert] : alert [Oriented to Person] : oriented to person [Oriented to Place] : oriented to place [Oriented to Time] : oriented to time [Calm] : calm [JVD] : no jugular venous distention  [Abdomen Tenderness] : ~T ~M No abdominal tenderness [de-identified] : Well-developed well-nourished white male in no acute distress [de-identified] : Within normal limits cranial nerves II through XII intact- [de-identified] : There is 8 reducible right inguinal hernia at the internal ring.  The right cord and testicle are normal.  Examination of the left groin fails to reveal any left inguinal hernia. [de-identified] : Normal strength and gait

## 2022-08-30 NOTE — HISTORY OF PRESENT ILLNESS
[de-identified] : BRENTON  is a 76 year  male  here for a consultation for possible right inguinal hernia.  Today pt reports no pain. Feels right sided bulge, (no discomfort or pain).. Daily BMs, soft, no straining, no bleeding. Denies nausea and vomiting. Denies fever and chills. Good appetite.  The patient states that while lifting buckets of ice and twisting he noticed some discomfort and a lump in his right groin

## 2022-08-30 NOTE — ASSESSMENT
[FreeTextEntry1] : I discussed with the patient the open repair of this right inguinal hernia.  The patient understands that mesh will be used.  The patient also understands that this will be done as an outpatient at either [facility] ambulatory facility or at the [facility] ambulatory facility.\par \par The patient understands that he will need medical clearance due to his valvular repair.\par \par I have discussed the risks and benefits  of surgery with the patient.  The risks which include  but are not exclusive of other issues included bleeding, bowel injury and the possibility of using mesh with the inherent risk of infection requiring the removal of the mesh.

## 2022-08-30 NOTE — CONSULT LETTER
[Dear  ___] : Dear  [unfilled], [Consult Letter:] : I had the pleasure of evaluating your patient, [unfilled]. [Please see my note below.] : Please see my note below. [Consult Closing:] : Thank you very much for allowing me to participate in the care of this patient.  If you have any questions, please do not hesitate to contact me. [Sincerely,] : Sincerely, [FreeTextEntry3] : I have reviewed all the documentation for this encounter with the patient and have edited where appropriate\par \par Dr. Matthew Thomson

## 2022-12-14 ENCOUNTER — OUTPATIENT (OUTPATIENT)
Dept: OUTPATIENT SERVICES | Facility: HOSPITAL | Age: 77
LOS: 1 days | End: 2022-12-14
Payer: MEDICARE

## 2022-12-14 VITALS
HEIGHT: 67 IN | OXYGEN SATURATION: 96 % | SYSTOLIC BLOOD PRESSURE: 139 MMHG | TEMPERATURE: 98 F | HEART RATE: 88 BPM | WEIGHT: 162.92 LBS | DIASTOLIC BLOOD PRESSURE: 86 MMHG | RESPIRATION RATE: 18 BRPM

## 2022-12-14 DIAGNOSIS — Z01.818 ENCOUNTER FOR OTHER PREPROCEDURAL EXAMINATION: ICD-10-CM

## 2022-12-14 DIAGNOSIS — Z98.890 OTHER SPECIFIED POSTPROCEDURAL STATES: Chronic | ICD-10-CM

## 2022-12-14 DIAGNOSIS — K40.90 UNILATERAL INGUINAL HERNIA, WITHOUT OBSTRUCTION OR GANGRENE, NOT SPECIFIED AS RECURRENT: ICD-10-CM

## 2022-12-14 DIAGNOSIS — I10 ESSENTIAL (PRIMARY) HYPERTENSION: ICD-10-CM

## 2022-12-14 LAB
ALBUMIN SERPL ELPH-MCNC: 4.5 G/DL — SIGNIFICANT CHANGE UP (ref 3.3–5)
ALP SERPL-CCNC: 101 U/L — SIGNIFICANT CHANGE UP (ref 40–120)
ALT FLD-CCNC: 21 U/L — SIGNIFICANT CHANGE UP (ref 10–45)
ANION GAP SERPL CALC-SCNC: 13 MMOL/L — SIGNIFICANT CHANGE UP (ref 5–17)
AST SERPL-CCNC: 24 U/L — SIGNIFICANT CHANGE UP (ref 10–40)
BILIRUB SERPL-MCNC: 0.2 MG/DL — SIGNIFICANT CHANGE UP (ref 0.2–1.2)
BUN SERPL-MCNC: 21 MG/DL — SIGNIFICANT CHANGE UP (ref 7–23)
CALCIUM SERPL-MCNC: 9.7 MG/DL — SIGNIFICANT CHANGE UP (ref 8.4–10.5)
CHLORIDE SERPL-SCNC: 101 MMOL/L — SIGNIFICANT CHANGE UP (ref 96–108)
CO2 SERPL-SCNC: 25 MMOL/L — SIGNIFICANT CHANGE UP (ref 22–31)
CREAT SERPL-MCNC: 0.89 MG/DL — SIGNIFICANT CHANGE UP (ref 0.5–1.3)
EGFR: 88 ML/MIN/1.73M2 — SIGNIFICANT CHANGE UP
GLUCOSE SERPL-MCNC: 101 MG/DL — HIGH (ref 70–99)
HCT VFR BLD CALC: 40.8 % — SIGNIFICANT CHANGE UP (ref 39–50)
HGB BLD-MCNC: 13.3 G/DL — SIGNIFICANT CHANGE UP (ref 13–17)
MCHC RBC-ENTMCNC: 29.1 PG — SIGNIFICANT CHANGE UP (ref 27–34)
MCHC RBC-ENTMCNC: 32.6 GM/DL — SIGNIFICANT CHANGE UP (ref 32–36)
MCV RBC AUTO: 89.3 FL — SIGNIFICANT CHANGE UP (ref 80–100)
NRBC # BLD: 0 /100 WBCS — SIGNIFICANT CHANGE UP (ref 0–0)
PLATELET # BLD AUTO: 232 K/UL — SIGNIFICANT CHANGE UP (ref 150–400)
POTASSIUM SERPL-MCNC: 4 MMOL/L — SIGNIFICANT CHANGE UP (ref 3.5–5.3)
POTASSIUM SERPL-SCNC: 4 MMOL/L — SIGNIFICANT CHANGE UP (ref 3.5–5.3)
PROT SERPL-MCNC: 7.4 G/DL — SIGNIFICANT CHANGE UP (ref 6–8.3)
RBC # BLD: 4.57 M/UL — SIGNIFICANT CHANGE UP (ref 4.2–5.8)
RBC # FLD: 12.5 % — SIGNIFICANT CHANGE UP (ref 10.3–14.5)
SODIUM SERPL-SCNC: 139 MMOL/L — SIGNIFICANT CHANGE UP (ref 135–145)
WBC # BLD: 6.56 K/UL — SIGNIFICANT CHANGE UP (ref 3.8–10.5)
WBC # FLD AUTO: 6.56 K/UL — SIGNIFICANT CHANGE UP (ref 3.8–10.5)

## 2022-12-14 PROCEDURE — 85027 COMPLETE CBC AUTOMATED: CPT

## 2022-12-14 PROCEDURE — 36415 COLL VENOUS BLD VENIPUNCTURE: CPT

## 2022-12-14 PROCEDURE — G0463: CPT

## 2022-12-14 PROCEDURE — 80053 COMPREHEN METABOLIC PANEL: CPT

## 2022-12-14 RX ORDER — SODIUM CHLORIDE 9 MG/ML
3 INJECTION INTRAMUSCULAR; INTRAVENOUS; SUBCUTANEOUS EVERY 8 HOURS
Refills: 0 | Status: DISCONTINUED | OUTPATIENT
Start: 2022-12-20 | End: 2023-01-03

## 2022-12-14 RX ORDER — CHLORHEXIDINE GLUCONATE 213 G/1000ML
1 SOLUTION TOPICAL DAILY
Refills: 0 | Status: DISCONTINUED | OUTPATIENT
Start: 2022-12-20 | End: 2023-01-03

## 2022-12-14 RX ORDER — ASPIRIN/CALCIUM CARB/MAGNESIUM 324 MG
1 TABLET ORAL
Qty: 0 | Refills: 0 | DISCHARGE

## 2022-12-14 RX ORDER — SODIUM CHLORIDE 9 MG/ML
1000 INJECTION, SOLUTION INTRAVENOUS
Refills: 0 | Status: DISCONTINUED | OUTPATIENT
Start: 2022-12-20 | End: 2023-01-03

## 2022-12-14 NOTE — H&P PST ADULT - PROBLEM SELECTOR PLAN 2
educated to continue HTN medication as scheduled  Per cardiologist patient to stop baby aspirin as of 12/13  Per cardiologist also stop meloxicam as of 12/15      Medical/cardiac clearance pending- patient reports was done today 12/14

## 2022-12-14 NOTE — H&P PST ADULT - NSICDXPASTMEDICALHX_GEN_ALL_CORE_FT
PAST MEDICAL HISTORY:  Acid Reflux     Alcohol Abuse "admits to daily drinking"    Cardiomegaly "athlete's heart"    Chronic Lower Back Pain     Diverticulosis     History of Rotator Cuff Tear bilateral    HTN - Hypertension     Hyperlipidemia     Left Ventricular Diastolic Dysfunction as per Echo report    Mitral Valve Prolapse     Old Right ACL Tear     Osteoarthrosis     Prostate Cancer " Trev 6"    Right Thyroid Nodule

## 2022-12-14 NOTE — H&P PST ADULT - HISTORY OF PRESENT ILLNESS
Patient comes to PST for right inguinal hernia repair with MD Thomson on 12/20/2022. Patient has a pmhx of HTN, HLD, alcohol abuse ( drinks about 1 bottle of wine a day), prostate ca, mitral valve prolapse.   Patient reports lifting 100 lbs of ice in summer of 2021 and felt a pop. Patient then followed up and was diagnosed with right sided inguinal hernia. patient denies any pain or discomfort. denies any swelling or redness. Patient reports over the last week having cough and congestion. denies any fever or chills. Patient saw cardiologist/ medical doctor today for clearance. Denies any other complaints at this time.     COVID vaccinated   denies COVID infection   COVID 12/17/2022     Patient comes to PST for right inguinal hernia repair with MD Thomson on 12/20/2022. Patient has a pmhx of HTN, HLD, alcohol abuse ( drinks about 1 bottle of wine a day), prostate ca, mitral valve prolapse.   Patient reports lifting 100 lbs of ice in summer of 2021 and felt a pop. Patient then followed up and was diagnosed with right sided inguinal hernia. patient denies any pain or discomfort. denies any swelling or redness. Patient reports over the last week having cough and congestion. denies any fever or chills. Patient saw cardiologist/ medical doctor today for clearance. Denies any other complaints at this time.     COVID vaccinated   denies COVID infection   COVID Testing 12/17/2022

## 2022-12-14 NOTE — H&P PST ADULT - NSICDXPASTSURGICALHX_GEN_ALL_CORE_FT
PAST SURGICAL HISTORY:  H/O mitral valve repair     History of Arthroscopy of bilateral shoulders with rotator cuff repair 5/2006    History of Arthroscopy of Knee (multiple) bilateral, last 2006    History of Left  Total Hip Replacement 3/2008    History of Tonsillectomy and Adenoidectomy     History of Total Hip Replacement Right 10/27/01

## 2022-12-14 NOTE — H&P PST ADULT - GASTROINTESTINAL COMMENTS
right inguinal hernia noted on palpation, no redness or swelling no tenderness right inguinal hernia

## 2022-12-14 NOTE — H&P PST ADULT - PROBLEM SELECTOR PLAN 1
educated on instructions  Chlorhexidine wash given to patient and instructions  Labs in PST: CBC, BMP    Medical/cardiac clearance pending- patient reports was done today 12/14

## 2022-12-17 ENCOUNTER — OUTPATIENT (OUTPATIENT)
Dept: OUTPATIENT SERVICES | Facility: HOSPITAL | Age: 77
LOS: 1 days | End: 2022-12-17
Payer: MEDICARE

## 2022-12-17 DIAGNOSIS — Z98.890 OTHER SPECIFIED POSTPROCEDURAL STATES: Chronic | ICD-10-CM

## 2022-12-17 DIAGNOSIS — Z11.52 ENCOUNTER FOR SCREENING FOR COVID-19: ICD-10-CM

## 2022-12-17 LAB — SARS-COV-2 RNA SPEC QL NAA+PROBE: SIGNIFICANT CHANGE UP

## 2022-12-17 PROCEDURE — U0003: CPT

## 2022-12-17 PROCEDURE — C9803: CPT

## 2022-12-17 PROCEDURE — U0005: CPT

## 2022-12-19 ENCOUNTER — TRANSCRIPTION ENCOUNTER (OUTPATIENT)
Age: 77
End: 2022-12-19

## 2022-12-20 ENCOUNTER — TRANSCRIPTION ENCOUNTER (OUTPATIENT)
Age: 77
End: 2022-12-20

## 2022-12-20 ENCOUNTER — OUTPATIENT (OUTPATIENT)
Dept: OUTPATIENT SERVICES | Facility: HOSPITAL | Age: 77
LOS: 1 days | End: 2022-12-20
Payer: MEDICARE

## 2022-12-20 ENCOUNTER — RESULT REVIEW (OUTPATIENT)
Age: 77
End: 2022-12-20

## 2022-12-20 ENCOUNTER — APPOINTMENT (OUTPATIENT)
Dept: SURGERY | Facility: HOSPITAL | Age: 77
End: 2022-12-20

## 2022-12-20 VITALS
SYSTOLIC BLOOD PRESSURE: 131 MMHG | TEMPERATURE: 97 F | HEART RATE: 70 BPM | DIASTOLIC BLOOD PRESSURE: 61 MMHG | OXYGEN SATURATION: 96 % | RESPIRATION RATE: 18 BRPM

## 2022-12-20 VITALS
HEIGHT: 67.5 IN | OXYGEN SATURATION: 99 % | HEART RATE: 70 BPM | SYSTOLIC BLOOD PRESSURE: 130 MMHG | TEMPERATURE: 98 F | RESPIRATION RATE: 18 BRPM | DIASTOLIC BLOOD PRESSURE: 70 MMHG | WEIGHT: 169.98 LBS

## 2022-12-20 DIAGNOSIS — Z01.818 ENCOUNTER FOR OTHER PREPROCEDURAL EXAMINATION: ICD-10-CM

## 2022-12-20 DIAGNOSIS — Z98.890 OTHER SPECIFIED POSTPROCEDURAL STATES: Chronic | ICD-10-CM

## 2022-12-20 DIAGNOSIS — K40.90 UNILATERAL INGUINAL HERNIA, WITHOUT OBSTRUCTION OR GANGRENE, NOT SPECIFIED AS RECURRENT: ICD-10-CM

## 2022-12-20 PROCEDURE — 88304 TISSUE EXAM BY PATHOLOGIST: CPT | Mod: 26

## 2022-12-20 PROCEDURE — C1781: CPT

## 2022-12-20 PROCEDURE — 49505 PRP I/HERN INIT REDUC >5 YR: CPT | Mod: RT

## 2022-12-20 PROCEDURE — 88304 TISSUE EXAM BY PATHOLOGIST: CPT

## 2022-12-20 PROCEDURE — 55520 REMOVAL OF SPERM CORD LESION: CPT | Mod: 59,RT

## 2022-12-20 DEVICE — MESH HERNIA PARIETEX PROGRIP 12 X 8CM RIGHT: Type: IMPLANTABLE DEVICE | Status: FUNCTIONAL

## 2022-12-20 RX ORDER — HYDROMORPHONE HYDROCHLORIDE 2 MG/ML
0.25 INJECTION INTRAMUSCULAR; INTRAVENOUS; SUBCUTANEOUS
Refills: 0 | Status: DISCONTINUED | OUTPATIENT
Start: 2022-12-20 | End: 2022-12-20

## 2022-12-20 RX ORDER — VALSARTAN 80 MG/1
1 TABLET ORAL
Qty: 0 | Refills: 0 | DISCHARGE

## 2022-12-20 RX ORDER — ONDANSETRON 8 MG/1
4 TABLET, FILM COATED ORAL ONCE
Refills: 0 | Status: DISCONTINUED | OUTPATIENT
Start: 2022-12-20 | End: 2023-01-03

## 2022-12-20 RX ORDER — ATORVASTATIN CALCIUM 80 MG/1
1 TABLET, FILM COATED ORAL
Qty: 0 | Refills: 0 | DISCHARGE

## 2022-12-20 RX ORDER — SODIUM CHLORIDE 9 MG/ML
1000 INJECTION, SOLUTION INTRAVENOUS
Refills: 0 | Status: DISCONTINUED | OUTPATIENT
Start: 2022-12-20 | End: 2023-01-03

## 2022-12-20 RX ORDER — CEFAZOLIN SODIUM 1 G
2000 VIAL (EA) INJECTION ONCE
Refills: 0 | Status: COMPLETED | OUTPATIENT
Start: 2022-12-20 | End: 2022-12-20

## 2022-12-20 RX ORDER — MELOXICAM 15 MG/1
1 TABLET ORAL
Qty: 0 | Refills: 0 | DISCHARGE

## 2022-12-20 RX ORDER — LIDOCAINE HCL 20 MG/ML
0.2 VIAL (ML) INJECTION ONCE
Refills: 0 | Status: COMPLETED | OUTPATIENT
Start: 2022-12-20 | End: 2022-12-20

## 2022-12-20 RX ORDER — OXYCODONE AND ACETAMINOPHEN 5; 325 MG/1; MG/1
5-325 TABLET ORAL
Qty: 6 | Refills: 0 | Status: ACTIVE | COMMUNITY
Start: 2022-12-20 | End: 1900-01-01

## 2022-12-20 RX ORDER — ESOMEPRAZOLE MAGNESIUM 40 MG/1
1 CAPSULE, DELAYED RELEASE ORAL
Qty: 0 | Refills: 0 | DISCHARGE

## 2022-12-20 RX ADMIN — SODIUM CHLORIDE 100 MILLILITER(S): 9 INJECTION, SOLUTION INTRAVENOUS at 06:45

## 2022-12-20 RX ADMIN — CHLORHEXIDINE GLUCONATE 1 APPLICATION(S): 213 SOLUTION TOPICAL at 06:51

## 2022-12-20 RX ADMIN — SODIUM CHLORIDE 3 MILLILITER(S): 9 INJECTION INTRAMUSCULAR; INTRAVENOUS; SUBCUTANEOUS at 06:50

## 2022-12-20 NOTE — ASU DISCHARGE PLAN (ADULT/PEDIATRIC) - CARE PROVIDER_API CALL
Matthew Thomson)  Surgery  [address], [address]  [place] [address]  Phone: [phone]  Fax: [phone]  Follow Up Time:

## 2022-12-20 NOTE — ASU DISCHARGE PLAN (ADULT/PEDIATRIC) - CALL YOUR DOCTOR IF YOU HAVE ANY OF THE FOLLOWING:
101F/Bleeding that does not stop/Swelling that gets worse/Pain not relieved by Medications/Fever greater than (need to indicate Fahrenheit or Celsius)

## 2022-12-20 NOTE — ASU PATIENT PROFILE, ADULT - NS PRO ABUSE SCREEN AFRAID ANYONE YN
"Colette is a 22 year old who is being evaluated via a billable video visit.      How would you like to obtain your AVS? [employer]  If the video visit is dropped, the invitation should be resent by: Text to cell phone: [phone]  Will anyone else be joining your video visit? No     Video Start Time: 11:26    Assessment & Plan     Adjustment disorder with depressed mood  . She really feels the medication has helped she is also exercising more and continues to see her therapist.  There is definitely concern for PMDD we will try higher dose of sertraline and see if this helps encouraged her to follow-up with a [employer] message your provider note in 1 month to review PHQ and SAMEER.  - EMOTIONAL / BEHAVIORAL ASSESSMENT  - EMOTIONAL / BEHAVIORAL ASSESSMENT  - sertraline (ZOLOFT) 50 MG tablet; Take 1 tablet (50 mg) by mouth daily    Prescription drug management  8 minutes spent on the date of the encounter doing chart review, history and exam, documentation and further activities per the note          Return in about 4 weeks (around 10/11/2021) for Please set up a \"message my provider\" note in [employer] [facility] in 1 month to review the questionaires.    Yaima Jaime MD  [facility]    Subjective   Colette is a 22 year old who presents for the following health issues     HPI     Depression and Anxiety Follow-Up    How are you doing with your depression since your last visit? Improved     Feels periods were contributing to her mood    Has had irregular periods    Last was late    Talked with her therapist    They wonder about PMDD    The 2 weeks before period is rough    Feels sertraline has been OK - feeling better hard to know if this is the cause    No side effects    Exercise is good - has started working out more in [facility]    How are you doing with your anxiety since your last visit?  Improved     Are you having other symptoms that might be associated with depression or anxiety? No    Have you had a " significant life event? No     Do you have any concerns with your use of alcohol or other drugs? No    Social History     Tobacco Use     Smoking status: Never Smoker     Smokeless tobacco: Never Used   Substance Use Topics     Alcohol use: No     Alcohol/week: 0.0 standard drinks     Drug use: No     PHQ 7/7/2016 8/16/2021   PHQ-9 Total Score 2 9   Q9: Thoughts of better off dead/self-harm past 2 weeks Not at all Not at all     SAMEER-7 SCORE 8/16/2021   Total Score 16 (severe anxiety)   Total Score 16     Last PHQ-9 9/13/2021   1.  Little interest or pleasure in doing things 1   2.  Feeling down, depressed, or hopeless 1   3.  Trouble falling or staying asleep, or sleeping too much 2   4.  Feeling tired or having little energy 0   5.  Poor appetite or overeating 0   6.  Feeling bad about yourself 1   7.  Trouble concentrating 1   8.  Moving slowly or restless 1   Q9: Thoughts of better off dead/self-harm past 2 weeks 0   PHQ-9 Total Score 7   Difficulty at work, home, or with people -     SAMEER-7  9/13/2021   1. Feeling nervous, anxious, or on edge 1   2. Not being able to stop or control worrying 1   3. Worrying too much about different things 1   4. Trouble relaxing 1   5. Being so restless that it is hard to sit still 1   6. Becoming easily annoyed or irritable 2   7. Feeling afraid, as if something awful might happen 1   SAMEER-7 Total Score 8       Suicide Assessment Five-step Evaluation and Treatment (SAFE-T)        How many servings of fruits and vegetables do you eat daily?  3-4    On average, how many sweetened beverages do you drink each day (Examples: soda, juice, sweet tea, etc.  Do NOT count diet or artificially sweetened beverages)?   0    How many days per week do you exercise enough to make your heart beat faster? 5    How many minutes a day do you exercise enough to make your heart beat faster? 30 - 60    How many days per week do you miss taking your medication? 0        Review of Systems    Constitutional, HEENT, cardiovascular, pulmonary, gi and gu systems are negative, except as otherwise noted.      Objective           Vitals:  No vitals were obtained today due to virtual visit.    Physical Exam   GENERAL: Healthy, alert and no distress  EYES: Eyes grossly normal to inspection.  No discharge or erythema, or obvious scleral/conjunctival abnormalities.  RESP: No audible wheeze, cough, or visible cyanosis.  No visible retractions or increased work of breathing.    SKIN: Visible skin clear. No significant rash, abnormal pigmentation or lesions.  NEURO: Cranial nerves grossly intact.  Mentation and speech appropriate for age.  PSYCH: Mentation appears normal, affect normal/bright, judgement and insight intact, normal speech and appearance well-groomed.                Video-Visit Details    Type of service:  Video Visit    Video End Time:11:33    Originating Location (pt. Location): Home    Distant Location (provider location):  [facility]     Platform used for Video Visit: [employer]  Answers for HPI/ROS submitted by the patient on 9/13/2021  If you checked off any problems, how difficult have these problems made it for you to do your work, take care of things at home, or get along with other people?: Somewhat difficult  PHQ9 TOTAL SCORE: 7  SAMEER 7 TOTAL SCORE: 8       no

## 2022-12-20 NOTE — ASU DISCHARGE PLAN (ADULT/PEDIATRIC) - NS MD DC FALL RISK RISK
For information on Fall & Injury Prevention, visit: [email or web address] OR  [email or web address] OR  https://www.cdc.gov/steadi/patient.html

## 2022-12-27 ENCOUNTER — APPOINTMENT (OUTPATIENT)
Dept: SURGERY | Facility: CLINIC | Age: 77
End: 2022-12-27

## 2022-12-27 VITALS
HEART RATE: 74 BPM | OXYGEN SATURATION: 98 % | RESPIRATION RATE: 17 BRPM | SYSTOLIC BLOOD PRESSURE: 156 MMHG | HEIGHT: 67 IN | TEMPERATURE: 95.6 F | DIASTOLIC BLOOD PRESSURE: 66 MMHG

## 2022-12-27 DIAGNOSIS — Z09 ENCOUNTER FOR FOLLOW-UP EXAMINATION AFTER COMPLETED TREATMENT FOR CONDITIONS OTHER THAN MALIGNANT NEOPLASM: ICD-10-CM

## 2022-12-27 PROCEDURE — 99024 POSTOP FOLLOW-UP VISIT: CPT

## 2022-12-27 NOTE — PHYSICAL EXAM
[Respiratory Effort] : normal respiratory effort [Alert] : alert [Oriented to Person] : oriented to person [Oriented to Place] : oriented to place [Oriented to Time] : oriented to time [Calm] : calm [de-identified] : well appearing [de-identified] : regular rate [de-identified] : soft, non tender, non distended\par right inguinal region without palpable recurrence on cough.  There is a palpable healing ridge.  No significant pain with palpation.  Incision is healing appropriately without erythema or drainage.

## 2022-12-27 NOTE — ASSESSMENT
[FreeTextEntry1] : 76yo M s/p right inguinal hernia repair on 12/20, with pain after lifting his domestic partner a few days ago, without obvious recurrence on exam.

## 2022-12-27 NOTE — HISTORY OF PRESENT ILLNESS
[de-identified] : Álvaro is a 77 year old male here for a post operative visit. S/P 12/20/22 Direct right inguinal hernia and lipoma of cord by Dr. Thomson.  He states that 2 days ago he needed to lift his paraplegic partner who had fallen, and afterward felt pain and a slight bulge at the site of his surgery.  He denies nausea vomiting.  No fevers or chills.  Having regular bowel movements without issues.  Has not tried anything for this pain.\par \par

## 2022-12-27 NOTE — PLAN
[FreeTextEntry1] : Discussed with patient he may have aggravated the operative site, but on exam, no evidence of early recurrence of hernia.  OK for tylenol, cold packs as needed for pain.  Advised to avoid heavy lifting if he can. All questions answered. Will follow up with Dr. Thomson next week.

## 2022-12-30 LAB — SURGICAL PATHOLOGY STUDY: SIGNIFICANT CHANGE UP

## 2023-01-03 ENCOUNTER — APPOINTMENT (OUTPATIENT)
Dept: SURGERY | Facility: CLINIC | Age: 78
End: 2023-01-03
Payer: MEDICARE

## 2023-01-03 VITALS
RESPIRATION RATE: 17 BRPM | SYSTOLIC BLOOD PRESSURE: 118 MMHG | HEART RATE: 81 BPM | HEIGHT: 67 IN | TEMPERATURE: 95.7 F | OXYGEN SATURATION: 98 % | DIASTOLIC BLOOD PRESSURE: 71 MMHG

## 2023-01-03 DIAGNOSIS — Z98.890 OTHER SPECIFIED POSTPROCEDURAL STATES: ICD-10-CM

## 2023-01-03 PROCEDURE — 99024 POSTOP FOLLOW-UP VISIT: CPT

## 2023-01-03 NOTE — PHYSICAL EXAM
[de-identified] : The right inguinal hernia repair is intact the right cord and testicle are normal

## 2023-01-03 NOTE — HISTORY OF PRESENT ILLNESS
[de-identified] : Álvaro is a 77 year old male here for follow up  visit. S/P 12/20/22 Direct right inguinal hernia and lipoma of cord.   Seen by Dr. Flowers on 12/27/22 -  Patient states that 2 days ago he needed to lift his paraplegic partner who had fallen, and afterward felt pain and a slight bulge at the site of his surgery. He denies nausea vomiting. No fevers or chills. Having regular bowel movements without issues. Has not tried anything for this pain.Discussed with patient he may have aggravated the operative site, but on exam, no evidence of early recurrence of hernia. OK for tylenol, cold packs as needed for pain. Advised to avoid heavy lifting if he can.  The patient states he has no more discomfort in the right groin.\par \par \par

## 2023-05-25 ENCOUNTER — NON-APPOINTMENT (OUTPATIENT)
Age: 78
End: 2023-05-25

## 2023-10-02 NOTE — H&P PST ADULT - NSANTHBMIRD_ENT_A_CORE
Lux Guerrero is a 52 year old male presenting for   Chief Complaint   Patient presents with   • Follow-up     Denies Latex allergy or sensitivity.    Medication verified and med list updated  Refills needed today: No    Health Maintenance Due   Topic Date Due   • Hepatitis B Vaccine (1 of 3 - 3-dose series) Never done   • COVID-19 Vaccine (1) Never done   • Colorectal Cancer Screen-  Never done   • Shingles Vaccine (1 of 2) Never done   • Influenza Vaccine (1) 09/01/2023   • Depression Screening  01/27/2024       Patient is due for topics as listed above but is not proceeding with Immunization(s) COVID-19, Hep B, Influenza and Shingles at this time.           Last lab results:   Hemoglobin A1C (%)   Date Value   02/15/2022 5.3     Cholesterol (mg/dL)   Date Value   07/22/2022 163     HDL (mg/dL)   Date Value   07/22/2022 41     Triglycerides (mg/dL)   Date Value   07/22/2022 322 (H)     LDL (mg/dL)   Date Value   07/22/2022 58     Creatinine, Urine (mg/dL)   Date Value   11/30/2020 85.30     Microalbumin/ Creatinine Ratio (mg/g)   Date Value   11/30/2020 10.8     No results found for: \"IFOB\"               Depression Screening:  Review Flowsheet  More data exists       10/2/2023   PHQ 2/9 Score   Adult PHQ 2 Score 0   Adult PHQ 2 Interpretation No further screening needed   Little interest or pleasure in activity? Not at all   Feeling down, depressed or hopeless? Not at all        Advance Directives:  not discussed     No

## 2023-11-06 ENCOUNTER — OFFICE (OUTPATIENT)
Facility: LOCATION | Age: 78
Setting detail: OPHTHALMOLOGY
End: 2023-11-06
Payer: MEDICARE

## 2023-11-06 DIAGNOSIS — H25.12: ICD-10-CM

## 2023-11-06 DIAGNOSIS — H26.491: ICD-10-CM

## 2023-11-06 PROCEDURE — 92136 OPHTHALMIC BIOMETRY: CPT | Mod: 26,LT | Performed by: OPHTHALMOLOGY

## 2023-11-06 PROCEDURE — 99214 OFFICE O/P EST MOD 30 MIN: CPT | Performed by: OPHTHALMOLOGY

## 2023-11-06 ASSESSMENT — CONFRONTATIONAL VISUAL FIELD TEST (CVF)
OS_FINDINGS: FULL
OD_FINDINGS: FULL

## 2023-11-06 ASSESSMENT — REFRACTION_AUTOREFRACTION
OS_AXIS: 172
OS_SPHERE: -2.00
OS_CYLINDER: -0.50
OD_SPHERE: +0.75

## 2023-11-06 ASSESSMENT — SPHEQUIV_DERIVED: OS_SPHEQUIV: -2.25

## 2023-11-06 ASSESSMENT — CORNEAL PTERYGIUM: OD_PTERYGIUM: NASAL 2MM

## 2023-11-22 ENCOUNTER — OFFICE (OUTPATIENT)
Facility: LOCATION | Age: 78
Setting detail: OPHTHALMOLOGY
End: 2023-11-22
Payer: MEDICARE

## 2023-11-22 ENCOUNTER — RX ONLY (RX ONLY)
Age: 78
End: 2023-11-22

## 2023-11-22 DIAGNOSIS — H26.491: ICD-10-CM

## 2023-11-22 PROCEDURE — 66821 AFTER CATARACT LASER SURGERY: CPT | Mod: RT | Performed by: OPHTHALMOLOGY

## 2023-11-22 ASSESSMENT — REFRACTION_AUTOREFRACTION
OS_AXIS: 172
OS_SPHERE: -2.00
OS_CYLINDER: -0.50
OD_SPHERE: +0.75

## 2023-11-22 ASSESSMENT — CORNEAL PTERYGIUM: OD_PTERYGIUM: NASAL 2MM

## 2023-11-22 ASSESSMENT — SPHEQUIV_DERIVED: OS_SPHEQUIV: -2.25

## 2023-11-22 ASSESSMENT — CONFRONTATIONAL VISUAL FIELD TEST (CVF)
OD_FINDINGS: FULL
OS_FINDINGS: FULL

## 2023-12-19 ENCOUNTER — ASC (OUTPATIENT)
Dept: URBAN - METROPOLITAN AREA SURGERY 8 | Facility: SURGERY | Age: 78
Setting detail: OPHTHALMOLOGY
End: 2023-12-19
Payer: MEDICARE

## 2023-12-19 DIAGNOSIS — H25.12: ICD-10-CM

## 2023-12-19 DIAGNOSIS — H52.212: ICD-10-CM

## 2023-12-19 PROCEDURE — FEMTO FEMTOSECOND LASER: Mod: GY | Performed by: OPHTHALMOLOGY

## 2023-12-19 PROCEDURE — 66984 XCAPSL CTRC RMVL W/O ECP: CPT | Mod: 79,LT | Performed by: OPHTHALMOLOGY

## 2023-12-19 PROCEDURE — A9270 NON-COVERED ITEM OR SERVICE: HCPCS | Mod: GY | Performed by: OPHTHALMOLOGY

## 2023-12-19 PROCEDURE — V2787 ASTIGMATISM-CORRECT FUNCTION: HCPCS | Performed by: OPHTHALMOLOGY

## 2023-12-20 ENCOUNTER — OFFICE (OUTPATIENT)
Facility: LOCATION | Age: 78
Setting detail: OPHTHALMOLOGY
End: 2023-12-20
Payer: MEDICARE

## 2023-12-20 DIAGNOSIS — Z96.1: ICD-10-CM

## 2023-12-20 PROCEDURE — 99024 POSTOP FOLLOW-UP VISIT: CPT | Performed by: OPHTHALMOLOGY

## 2023-12-20 ASSESSMENT — CONFRONTATIONAL VISUAL FIELD TEST (CVF)
OD_FINDINGS: FULL
OS_FINDINGS: FULL

## 2023-12-20 ASSESSMENT — REFRACTION_AUTOREFRACTION
OS_SPHERE: +0.75
OD_CYLINDER: SPHERE
OS_CYLINDER: -1.00
OD_SPHERE: +0.75
OS_AXIS: 016

## 2023-12-20 ASSESSMENT — CORNEAL PTERYGIUM: OD_PTERYGIUM: NASAL 2MM

## 2023-12-20 ASSESSMENT — SPHEQUIV_DERIVED: OS_SPHEQUIV: 0.25

## 2023-12-27 ENCOUNTER — OFFICE (OUTPATIENT)
Facility: LOCATION | Age: 78
Setting detail: OPHTHALMOLOGY
End: 2023-12-27
Payer: MEDICARE

## 2023-12-27 DIAGNOSIS — Z96.1: ICD-10-CM

## 2023-12-27 PROCEDURE — 99024 POSTOP FOLLOW-UP VISIT: CPT | Performed by: OPHTHALMOLOGY

## 2023-12-27 ASSESSMENT — REFRACTION_MANIFEST
OS_CYLINDER: -0.75
OS_VA1: 20/20
OS_SPHERE: -0.25
OS_AXIS: 045

## 2023-12-27 ASSESSMENT — REFRACTION_AUTOREFRACTION
OS_CYLINDER: -1.00
OD_CYLINDER: -0.25
OD_SPHERE: +0.75
OD_AXIS: 118
OS_AXIS: 012
OS_SPHERE: +0.25

## 2023-12-27 ASSESSMENT — CONFRONTATIONAL VISUAL FIELD TEST (CVF)
OD_FINDINGS: FULL
OS_FINDINGS: FULL

## 2023-12-27 ASSESSMENT — CORNEAL PTERYGIUM: OD_PTERYGIUM: NASAL 2MM

## 2023-12-27 ASSESSMENT — SPHEQUIV_DERIVED
OS_SPHEQUIV: -0.625
OD_SPHEQUIV: 0.625
OS_SPHEQUIV: -0.25

## 2024-03-27 ENCOUNTER — OFFICE (OUTPATIENT)
Facility: LOCATION | Age: 79
Setting detail: OPHTHALMOLOGY
End: 2024-03-27
Payer: MEDICARE

## 2024-03-27 DIAGNOSIS — H11.041: ICD-10-CM

## 2024-03-27 DIAGNOSIS — H00.14: ICD-10-CM

## 2024-03-27 PROCEDURE — 92014 COMPRE OPH EXAM EST PT 1/>: CPT | Performed by: OPHTHALMOLOGY

## 2024-03-27 ASSESSMENT — REFRACTION_MANIFEST
OS_VA1: 20/20
OS_AXIS: 045
OS_CYLINDER: -0.75
OS_SPHERE: -0.25

## 2024-03-27 ASSESSMENT — SPHEQUIV_DERIVED: OS_SPHEQUIV: -0.625

## 2024-04-27 NOTE — ED ADULT NURSE NOTE - OBJECTIVE STATEMENT
N/A
Male 72 years old alert and orientedx3 was brought in by EMS for intermittent fever for 4 days, highest temp is 104F. Pt is s/p nerve ablation of right thigh 2 weeks ago, had MRI of lower back yesterday for pain at right lower back to right knee. Denies N/V, diarrhea, constipation. Denies urinary symptoms. Labs obtained. Will monitor.

## 2024-11-13 ENCOUNTER — OFFICE (OUTPATIENT)
Facility: LOCATION | Age: 79
Setting detail: OPHTHALMOLOGY
End: 2024-11-13
Payer: MEDICARE

## 2024-11-13 DIAGNOSIS — H35.371: ICD-10-CM

## 2024-11-13 DIAGNOSIS — H11.041: ICD-10-CM

## 2024-11-13 PROCEDURE — 92285 EXTERNAL OCULAR PHOTOGRAPHY: CPT | Performed by: OPHTHALMOLOGY

## 2024-11-13 PROCEDURE — 92012 INTRM OPH EXAM EST PATIENT: CPT | Performed by: OPHTHALMOLOGY

## 2024-11-13 ASSESSMENT — REFRACTION_AUTOREFRACTION
OD_SPHERE: +0.75
OS_AXIS: 005
OD_CYLINDER: SPH
OS_SPHERE: -2.50
OS_CYLINDER: -1.00

## 2024-11-13 ASSESSMENT — KERATOMETRY
OS_K2POWER_DIOPTERS: 45.00
METHOD_AUTO_MANUAL: AUTO
OS_K1POWER_DIOPTERS: 44.00
OD_K2POWER_DIOPTERS: 44.25
OS_AXISANGLE_DEGREES: 095
OD_AXISANGLE_DEGREES: 100
OD_K1POWER_DIOPTERS: 44.50

## 2024-11-13 ASSESSMENT — VISUAL ACUITY
OS_BCVA: 20/25-2
OD_BCVA: 20/70-2

## 2024-11-13 ASSESSMENT — REFRACTION_MANIFEST
OS_CYLINDER: -0.75
OS_VA1: 20/20
OS_AXIS: 045
OS_SPHERE: -0.25

## 2024-11-13 ASSESSMENT — CONFRONTATIONAL VISUAL FIELD TEST (CVF)
OS_FINDINGS: FULL
OD_FINDINGS: FULL

## 2024-11-13 ASSESSMENT — CORNEAL PTERYGIUM: OD_PTERYGIUM: NASAL 2MM

## 2024-11-13 ASSESSMENT — TONOMETRY
OD_IOP_MMHG: 17
OS_IOP_MMHG: 17

## 2025-01-07 ENCOUNTER — NON-APPOINTMENT (OUTPATIENT)
Age: 80
End: 2025-01-07

## 2025-01-13 ENCOUNTER — APPOINTMENT (OUTPATIENT)
Dept: UROLOGY | Facility: CLINIC | Age: 80
End: 2025-01-13
Payer: MEDICARE

## 2025-01-13 VITALS — SYSTOLIC BLOOD PRESSURE: 149 MMHG | OXYGEN SATURATION: 99 % | HEART RATE: 72 BPM | DIASTOLIC BLOOD PRESSURE: 83 MMHG

## 2025-01-13 DIAGNOSIS — N43.3 HYDROCELE, UNSPECIFIED: ICD-10-CM

## 2025-01-13 DIAGNOSIS — C61 MALIGNANT NEOPLASM OF PROSTATE: ICD-10-CM

## 2025-01-13 PROCEDURE — 99203 OFFICE O/P NEW LOW 30 MIN: CPT

## 2025-01-19 LAB — PSA SERPL-MCNC: 0.58 NG/ML

## 2025-07-02 NOTE — PATIENT PROFILE ADULT. - ARE SIGNIFICANT INDICATORS COMPLETE.
[Bone Marrow Biopsy] : bone marrow biopsy [Bone Marrow Aspiration] : bone marrow aspiration  [Patient] : the patient [Patient identification verified] : patient identification verified [Procedure verified and consent obtained] : procedure verified and consent obtained [Laterality verified and correct site marked] : laterality verified and correct site marked [Left] : site: left [Correct positioning] : correct positioning [Prone] : prone [Superior iliac spine was identified] : the superior iliac spine was identified. [The left posterior iliac crest was prepped with betadine and draped, using sterile technique.] : The left posterior iliac crest was prepped with betadine and draped, using sterile technique. [Lidocaine was injected and into the periosteum overlying the site.] : Lidocaine was injected and into the periosteum overlying the site. [Aspirate] : aspirate [Cytogenetics] : cytogenetics [FISH] : FISH [Other ___] : [unfilled] [Biopsy] : biopsy [Flow Cytometry] : flow cytometry [] : The patient was instructed to remove the bandage the following AM. The patient may bathe. Acetaminophen may be taken for discomfort, as per package directions.If there are any other problems, the patient was instructed to call the office. The patient verbalized understanding, and is aware of the office contact numbers. [FreeTextEntry1] : Elevated free light chains rule out myeloma, amyloidosis Yes

## (undated) DEVICE — DRAIN PENROSE .5" X 18" LATEX

## (undated) DEVICE — SUT POLYSORB 3-0 30" V-20 UNDYED

## (undated) DEVICE — SPECIMEN CONTAINER 100ML

## (undated) DEVICE — SUT MONOCRYL 4-0 27" PS-2 UNDYED

## (undated) DEVICE — LAP PAD 18 X 18"

## (undated) DEVICE — PREP CHLORAPREP HI-LITE ORANGE 26ML

## (undated) DEVICE — DRSG TELFA 3 X 8

## (undated) DEVICE — VENODYNE/SCD SLEEVE CALF MEDIUM

## (undated) DEVICE — MARKING PEN W RULER

## (undated) DEVICE — SUT POLYSORB 3-0 18" TIES UNDYED

## (undated) DEVICE — POSITIONER PATIENT SAFETY STRAP 3X60"

## (undated) DEVICE — WARMING BLANKET UPPER ADULT

## (undated) DEVICE — DRSG TEGADERM + PAD 3.5X4"

## (undated) DEVICE — GOWN TRIMAX LG

## (undated) DEVICE — DRAPE TOWEL BLUE 17" X 24"

## (undated) DEVICE — PACK ADULT HERNIA

## (undated) DEVICE — POSITIONER FOAM EGG CRATE ULNAR 2PCS (PINK)

## (undated) DEVICE — DRAIN PENROSE .25" X 18" LATEX

## (undated) DEVICE — SUT POLYSORB 2-0 30" V-20 UNDYED

## (undated) DEVICE — DRSG STERISTRIPS 0.5 X 4"

## (undated) DEVICE — SUT SURGIPRO 0 30" GS-22

## (undated) DEVICE — SOL IRR POUR NS 0.9% 500ML

## (undated) DEVICE — SUT POLYSORB 2-0 18" TIES UNDYED

## (undated) DEVICE — BLADE SCALPEL SAFETYLOCK #15

## (undated) DEVICE — SOL IRR POUR H2O 250ML

## (undated) DEVICE — DRSG CURITY GAUZE SPONGE 4 X 4" 12-PLY

## (undated) DEVICE — GLV 8.5 PROTEXIS (WHITE)

## (undated) DEVICE — MEDICATION LABELS W MARKER

## (undated) DEVICE — SUT SURGIPRO 2-0 30" GS-22

## (undated) DEVICE — DRAPE INSTRUMENT POUCH 6.75" X 11"

## (undated) DEVICE — VISITEC 4X4